# Patient Record
Sex: FEMALE | Race: WHITE
[De-identification: names, ages, dates, MRNs, and addresses within clinical notes are randomized per-mention and may not be internally consistent; named-entity substitution may affect disease eponyms.]

---

## 2020-07-06 ENCOUNTER — HOSPITAL ENCOUNTER (INPATIENT)
Dept: HOSPITAL 61 - ER | Age: 35
LOS: 4 days | Discharge: HOME | DRG: 389 | End: 2020-07-10
Attending: INTERNAL MEDICINE | Admitting: INTERNAL MEDICINE
Payer: COMMERCIAL

## 2020-07-06 VITALS — DIASTOLIC BLOOD PRESSURE: 72 MMHG | SYSTOLIC BLOOD PRESSURE: 118 MMHG

## 2020-07-06 VITALS — DIASTOLIC BLOOD PRESSURE: 67 MMHG | SYSTOLIC BLOOD PRESSURE: 120 MMHG

## 2020-07-06 VITALS — HEIGHT: 65 IN | WEIGHT: 172.4 LBS | BODY MASS INDEX: 28.72 KG/M2

## 2020-07-06 VITALS — SYSTOLIC BLOOD PRESSURE: 118 MMHG | DIASTOLIC BLOOD PRESSURE: 74 MMHG

## 2020-07-06 VITALS — DIASTOLIC BLOOD PRESSURE: 49 MMHG | SYSTOLIC BLOOD PRESSURE: 136 MMHG

## 2020-07-06 VITALS — DIASTOLIC BLOOD PRESSURE: 62 MMHG | SYSTOLIC BLOOD PRESSURE: 93 MMHG

## 2020-07-06 VITALS — SYSTOLIC BLOOD PRESSURE: 102 MMHG | DIASTOLIC BLOOD PRESSURE: 62 MMHG

## 2020-07-06 DIAGNOSIS — K56.600: Primary | ICD-10-CM

## 2020-07-06 DIAGNOSIS — K52.9: ICD-10-CM

## 2020-07-06 DIAGNOSIS — Z20.828: ICD-10-CM

## 2020-07-06 DIAGNOSIS — Z98.84: ICD-10-CM

## 2020-07-06 DIAGNOSIS — Z83.3: ICD-10-CM

## 2020-07-06 DIAGNOSIS — E05.90: ICD-10-CM

## 2020-07-06 DIAGNOSIS — E53.8: ICD-10-CM

## 2020-07-06 DIAGNOSIS — N39.0: ICD-10-CM

## 2020-07-06 DIAGNOSIS — Z88.8: ICD-10-CM

## 2020-07-06 DIAGNOSIS — E03.9: ICD-10-CM

## 2020-07-06 DIAGNOSIS — F42.9: ICD-10-CM

## 2020-07-06 LAB
ALBUMIN SERPL-MCNC: 3.8 G/DL (ref 3.4–5)
ALBUMIN/GLOB SERPL: 0.9 {RATIO} (ref 1–1.7)
ALP SERPL-CCNC: 37 U/L (ref 46–116)
ALT SERPL-CCNC: 21 U/L (ref 14–59)
ANION GAP SERPL CALC-SCNC: 11 MMOL/L (ref 6–14)
APTT PPP: YELLOW S
AST SERPL-CCNC: 19 U/L (ref 15–37)
BACTERIA #/AREA URNS HPF: (no result) /HPF
BASOPHILS # BLD AUTO: 0 X10^3/UL (ref 0–0.2)
BASOPHILS NFR BLD: 0 % (ref 0–3)
BILIRUB SERPL-MCNC: 0.4 MG/DL (ref 0.2–1)
BILIRUB UR QL STRIP: NEGATIVE
BUN SERPL-MCNC: 15 MG/DL (ref 7–20)
BUN/CREAT SERPL: 15 (ref 6–20)
CALCIUM SERPL-MCNC: 9 MG/DL (ref 8.5–10.1)
CHLORIDE SERPL-SCNC: 103 MMOL/L (ref 98–107)
CO2 SERPL-SCNC: 26 MMOL/L (ref 21–32)
CREAT SERPL-MCNC: 1 MG/DL (ref 0.6–1)
EOSINOPHIL NFR BLD: 0 % (ref 0–3)
EOSINOPHIL NFR BLD: 0 X10^3/UL (ref 0–0.7)
ERYTHROCYTE [DISTWIDTH] IN BLOOD BY AUTOMATED COUNT: 17.7 % (ref 11.5–14.5)
FIBRINOGEN PPP-MCNC: CLEAR MG/DL
GFR SERPLBLD BASED ON 1.73 SQ M-ARVRAT: 63.5 ML/MIN
GLOBULIN SER-MCNC: 4.4 G/DL (ref 2.2–3.8)
GLUCOSE SERPL-MCNC: 125 MG/DL (ref 70–99)
HCT VFR BLD CALC: 39.8 % (ref 36–47)
HGB BLD-MCNC: 12.9 G/DL (ref 12–15.5)
LIPASE: 167 U/L (ref 73–393)
LYMPHOCYTES # BLD: 1.3 X10^3/UL (ref 1–4.8)
LYMPHOCYTES NFR BLD AUTO: 13 % (ref 24–48)
MCH RBC QN AUTO: 26 PG (ref 25–35)
MCHC RBC AUTO-ENTMCNC: 32 G/DL (ref 31–37)
MCV RBC AUTO: 79 FL (ref 79–100)
MONO #: 0.6 X10^3/UL (ref 0–1.1)
MONOCYTES NFR BLD: 6 % (ref 0–9)
NEUT #: 8.1 X10^3/UL (ref 1.8–7.7)
NEUTROPHILS NFR BLD AUTO: 81 % (ref 31–73)
NITRITE UR QL STRIP: NEGATIVE
PH UR STRIP: 6.5 [PH]
PLATELET # BLD AUTO: 328 X10^3/UL (ref 140–400)
POTASSIUM SERPL-SCNC: 3.9 MMOL/L (ref 3.5–5.1)
PROT SERPL-MCNC: 8.2 G/DL (ref 6.4–8.2)
PROT UR STRIP-MCNC: NEGATIVE MG/DL
RBC # BLD AUTO: 5.02 X10^6/UL (ref 3.5–5.4)
RBC #/AREA URNS HPF: (no result) /HPF (ref 0–2)
SODIUM SERPL-SCNC: 140 MMOL/L (ref 136–145)
SQUAMOUS #/AREA URNS LPF: (no result) /LPF
UROBILINOGEN UR-MCNC: 1 MG/DL
WBC # BLD AUTO: 10 X10^3/UL (ref 4–11)
WBC #/AREA URNS HPF: (no result) /HPF (ref 0–4)

## 2020-07-06 PROCEDURE — 96372 THER/PROPH/DIAG INJ SC/IM: CPT

## 2020-07-06 PROCEDURE — 87186 SC STD MICRODIL/AGAR DIL: CPT

## 2020-07-06 PROCEDURE — G0378 HOSPITAL OBSERVATION PER HR: HCPCS

## 2020-07-06 PROCEDURE — 87077 CULTURE AEROBIC IDENTIFY: CPT

## 2020-07-06 PROCEDURE — 83550 IRON BINDING TEST: CPT

## 2020-07-06 PROCEDURE — 96376 TX/PRO/DX INJ SAME DRUG ADON: CPT

## 2020-07-06 PROCEDURE — 87086 URINE CULTURE/COLONY COUNT: CPT

## 2020-07-06 PROCEDURE — 81001 URINALYSIS AUTO W/SCOPE: CPT

## 2020-07-06 PROCEDURE — 74177 CT ABD & PELVIS W/CONTRAST: CPT

## 2020-07-06 PROCEDURE — C9113 INJ PANTOPRAZOLE SODIUM, VIA: HCPCS

## 2020-07-06 PROCEDURE — 76856 US EXAM PELVIC COMPLETE: CPT

## 2020-07-06 PROCEDURE — 83690 ASSAY OF LIPASE: CPT

## 2020-07-06 PROCEDURE — 83540 ASSAY OF IRON: CPT

## 2020-07-06 PROCEDURE — 81025 URINE PREGNANCY TEST: CPT

## 2020-07-06 PROCEDURE — 85027 COMPLETE CBC AUTOMATED: CPT

## 2020-07-06 PROCEDURE — 88175 CYTOPATH C/V AUTO FLUID REDO: CPT

## 2020-07-06 PROCEDURE — 74022 RADEX COMPL AQT ABD SERIES: CPT

## 2020-07-06 PROCEDURE — 82962 GLUCOSE BLOOD TEST: CPT

## 2020-07-06 PROCEDURE — 80048 BASIC METABOLIC PNL TOTAL CA: CPT

## 2020-07-06 PROCEDURE — 85025 COMPLETE CBC W/AUTO DIFF WBC: CPT

## 2020-07-06 PROCEDURE — 82607 VITAMIN B-12: CPT

## 2020-07-06 PROCEDURE — 74018 RADEX ABDOMEN 1 VIEW: CPT

## 2020-07-06 PROCEDURE — 96374 THER/PROPH/DIAG INJ IV PUSH: CPT

## 2020-07-06 PROCEDURE — 36415 COLL VENOUS BLD VENIPUNCTURE: CPT

## 2020-07-06 PROCEDURE — 80053 COMPREHEN METABOLIC PANEL: CPT

## 2020-07-06 PROCEDURE — 96361 HYDRATE IV INFUSION ADD-ON: CPT

## 2020-07-06 PROCEDURE — 83605 ASSAY OF LACTIC ACID: CPT

## 2020-07-06 RX ADMIN — HYDROMORPHONE HYDROCHLORIDE PRN MG: 2 INJECTION INTRAMUSCULAR; INTRAVENOUS; SUBCUTANEOUS at 06:06

## 2020-07-06 RX ADMIN — BACITRACIN SCH MLS/HR: 5000 INJECTION, POWDER, FOR SOLUTION INTRAMUSCULAR at 17:29

## 2020-07-06 RX ADMIN — HYDROMORPHONE HYDROCHLORIDE PRN MG: 2 INJECTION INTRAMUSCULAR; INTRAVENOUS; SUBCUTANEOUS at 10:45

## 2020-07-06 RX ADMIN — PANTOPRAZOLE SODIUM SCH MG: 40 INJECTION, POWDER, FOR SOLUTION INTRAVENOUS at 17:27

## 2020-07-06 RX ADMIN — HYDROMORPHONE HYDROCHLORIDE PRN MG: 2 INJECTION INTRAMUSCULAR; INTRAVENOUS; SUBCUTANEOUS at 13:35

## 2020-07-06 RX ADMIN — MORPHINE SULFATE PRN MG: 2 INJECTION, SOLUTION INTRAMUSCULAR; INTRAVENOUS at 17:28

## 2020-07-06 RX ADMIN — BACITRACIN SCH MLS/HR: 5000 INJECTION, POWDER, FOR SOLUTION INTRAMUSCULAR at 06:06

## 2020-07-06 NOTE — PHYS DOC
Past Medical History


Past Medical History:  Hypothyroid, Other


Additional Past Medical Histor:  OCD,VIT B DEFFIC.


Past Surgical History:  Other


Additional Past Surgical Histo:  GASTRIC SLEEVE,TUBAL LIGATION,C SECT X 5


Smoking Status:  Never Smoker


Alcohol Use:  None





General Adult


EDM:


Chief Complaint:  ABDOMINAL PAIN





HPI:


HPI:





Patient is a 34  year old female who presents to the ED with a chief complaint 

of abdominal pain.  Patient states that she has had the pain on and off all day 

today.  Patient states that she had episodes of vomiting also today.  Patient 

has been states that he has had diarrhea all day.  Patient states that the 

barbiturate yesterday and thinks that she may have had something that caused 

this.  Patient denies pregnancy.  Patient did say that she has a history of 

gastric bypass surgery and has a gastric pouch currently.





Review of Systems:


Review of Systems:


Constitutional:   Denies fever or chills. []


Eyes:   Denies change in visual acuity. []


HENT:   Denies nasal congestion or sore throat. [] 


Respiratory:   Denies cough or shortness of breath. [] 


Cardiovascular:   Denies chest pain or edema. [] 


GI:   Patient complains of abdominal pain, nausea and vomiting [] 


:  Denies dysuria. [] 


Neurologic:   Denies headache, focal weakness or sensory changes. []





Heart Score:


Risk Factors:


Risk Factors:  DM, Current or recent (<one month) smoker, HTN, HLP, family 

history of CAD, obesity.


Risk Scores:


Score 0 - 3:  2.5% MACE over next 6 weeks - Discharge Home


Score 4 - 6:  20.3% MACE over next 6 weeks - Admit for Clinical Observation


Score 7 - 10:  72.7% MACE over next 6 weeks - Early Invasive Strategies





Current Medications:





Current Medications








 Medications


  (Trade)  Dose


 Ordered  Sig/Colton  Start Time


 Stop Time Status Last Admin


Dose Admin


 


 Dicyclomine HCl


  (Bentyl)  20 mg  1X  ONCE  7/6/20 02:30


 7/6/20 02:31 DC 7/6/20 02:21


20 MG


 


 Hydromorphone HCl


  (Dilaudid)  1 mg  1X  ONCE  7/6/20 03:00


 7/6/20 03:01 DC  





 


 Info


  (CONTRAST GIVEN


 -- Rx MONITORING)  1 each  PRN DAILY  PRN  7/6/20 03:15


 7/8/20 03:14   





 


 Iohexol


  (Omnipaque 300


 Mg/ml)  75 ml  1X  ONCE  7/6/20 03:30


 7/6/20 03:31  7/6/20 03:11


75 ML


 


 Morphine Sulfate


  (Morphine


 Sulfate)  4 mg  1X  ONCE  7/6/20 02:30


 7/6/20 02:31 DC  





 


 Ondansetron HCl


  (Zofran)  4 mg  1X  ONCE  7/6/20 02:30


 7/6/20 02:31 DC 7/6/20 02:37


4 MG


 


 Sodium Chloride  1,000 ml @ 


 1,000 mls/hr  1X  ONCE  7/6/20 01:30


 7/6/20 02:29 DC 7/6/20 01:10


1,000 MLS/HR











Allergies:


Allergies:





Allergies








Coded Allergies Type Severity Reaction Last Updated Verified


 


  Penicillins Allergy Intermediate HIVES 7/6/20 Yes


 


  morphine Allergy Intermediate hives 7/6/20 Yes











Physical Exam:


PE:





Constitutional: Well developed, well nourished, no acute distress, non-toxic 

appearance. []


HENT: Normocephalic, atraumatic


Eyes: EOMI


Neck: Normal range of motion, Supple


Cardiovascular:Heart rate regular rhythm


Lungs & Thorax:  Bilateral breath sounds clear to auscultation []


Abdomen: Diffuse abdominal tenderness.  No focal abdominal tenderness


Extremities: No tenderness, ROM intact


Neurologic: Alert and oriented X 3





Current Patient Data:


Labs:





                                Laboratory Tests








Test


 7/6/20


00:48 7/6/20


01:30 7/6/20


01:54


 


White Blood Count


 10.0 x10^3/uL


(4.0-11.0) 


 





 


Red Blood Count


 5.02 x10^6/uL


(3.50-5.40) 


 





 


Hemoglobin


 12.9 g/dL


(12.0-15.5) 


 





 


Hematocrit


 39.8 %


(36.0-47.0) 


 





 


Mean Corpuscular Volume


 79 fL ()


 


 





 


Mean Corpuscular Hemoglobin 26 pg (25-35)    


 


Mean Corpuscular Hemoglobin


Concent 32 g/dL


(31-37) 


 





 


Red Cell Distribution Width


 17.7 %


(11.5-14.5)  H 


 





 


Platelet Count


 328 x10^3/uL


(140-400) 


 





 


Neutrophils (%) (Auto) 81 % (31-73)  H  


 


Lymphocytes (%) (Auto) 13 % (24-48)  L  


 


Monocytes (%) (Auto) 6 % (0-9)    


 


Eosinophils (%) (Auto) 0 % (0-3)    


 


Basophils (%) (Auto) 0 % (0-3)    


 


Neutrophils # (Auto)


 8.1 x10^3/uL


(1.8-7.7)  H 


 





 


Lymphocytes # (Auto)


 1.3 x10^3/uL


(1.0-4.8) 


 





 


Monocytes # (Auto)


 0.6 x10^3/uL


(0.0-1.1) 


 





 


Eosinophils # (Auto)


 0.0 x10^3/uL


(0.0-0.7) 


 





 


Basophils # (Auto)


 0.0 x10^3/uL


(0.0-0.2) 


 





 


Sodium Level


 140 mmol/L


(136-145) 


 





 


Potassium Level


 3.9 mmol/L


(3.5-5.1) 


 





 


Chloride Level


 103 mmol/L


() 


 





 


Carbon Dioxide Level


 26 mmol/L


(21-32) 


 





 


Anion Gap 11 (6-14)    


 


Blood Urea Nitrogen


 15 mg/dL


(7-20) 


 





 


Creatinine


 1.0 mg/dL


(0.6-1.0) 


 





 


Estimated GFR


(Cockcroft-Gault) 63.5  


 


 





 


BUN/Creatinine Ratio 15 (6-20)    


 


Glucose Level


 125 mg/dL


(70-99)  H 


 





 


Lactic Acid Level


 1.7 mmol/L


(0.4-2.0) 


 





 


Calcium Level


 9.0 mg/dL


(8.5-10.1) 


 





 


Total Bilirubin


 0.4 mg/dL


(0.2-1.0) 


 





 


Aspartate Amino Transferase


(AST) 19 U/L (15-37)


 


 





 


Alanine Aminotransferase (ALT)


 21 U/L (14-59)


 


 





 


Alkaline Phosphatase


 37 U/L


()  L 


 





 


Total Protein


 8.2 g/dL


(6.4-8.2) 


 





 


Albumin


 3.8 g/dL


(3.4-5.0) 


 





 


Albumin/Globulin Ratio


 0.9 (1.0-1.7)


L 


 





 


Lipase


 167 U/L


() 


 





 


Urine Collection Type  Unknown   


 


Urine Color  Yellow   


 


Urine Clarity  Clear   


 


Urine pH


 


 6.5 (<5.0-8.0)


 





 


Urine Specific Gravity


 


 >=1.030


(1.000-1.030) 





 


Urine Protein


 


 Negative mg/dL


(NEG-TRACE) 





 


Urine Glucose (UA)


 


 Negative mg/dL


(NEG) 





 


Urine Ketones (Stick)


 


 40 mg/dL (NEG)


 





 


Urine Blood


 


 Moderate (NEG)


 





 


Urine Nitrite


 


 Negative (NEG)


 





 


Urine Bilirubin


 


 Negative (NEG)


 





 


Urine Urobilinogen Dipstick


 


 1.0 mg/dL (0.2


mg/dL) 





 


Urine Leukocyte Esterase


 


 Negative (NEG)


 





 


Urine RBC


 


 Occ /HPF (0-2)


 





 


Urine WBC


 


 5-10 /HPF


(0-4) 





 


Urine Squamous Epithelial


Cells 


 Mod /LPF  


 





 


Urine Bacteria


 


 Many /HPF


(0-FEW) 





 


Urine Mucus  Marked /LPF   


 


POC Urine HCG, Qualitative


 


 


 Hcg negative


(Negative)





                                Laboratory Tests


7/6/20 00:48








                                Laboratory Tests


7/6/20 00:48








Vital Signs:





                                   Vital Signs








  Date Time  Temp Pulse Resp B/P (MAP) Pulse Ox O2 Delivery O2 Flow Rate FiO2


 


7/6/20 02:11  58 20 116/69 (85) 99 Room Air  


 


7/6/20 00:38 98.3       





 98.3       











EKG:


EKG:


[]





Radiology/Procedures:


Radiology/Procedures:


[]


Impression:


CT ABD/PELVIS


IMPRESSION:


 


1. Multiple fluid-filled mildly dilated loops of small bowel without a 


definite transition point to decompressed small bowel. Findings may 


represent enteritis or potentially early/partial small bowel obstruction. 


Fluid is noted in the cecum.


 


2. Poorly characterized area of masslike fullness in the left hemipelvis 


with adjacent pelvic lymphadenopathy. Imaging characteristics are 


nonspecific, but considerations would include conglomerate 


lymphadenopathy, rectosigmoid mass, or exophytic uterine/cervical mass. 


Contrast-enhanced pelvic MRI may provide additional information.





Course & Med Decision Making:


Course & Med Decision Making


Pertinent Labs and Imaging studies reviewed. (See chart for details)





Labs are within normal limits.





Patient is given Dilaudid 1 mg IV and Zofran 4 mg IV.  She is also given Bentyl 

20 mg IM.





Awaiting CT scan results.





CT shows enteritis and partial small bowel obstruction.  There is also a pelvic 

mass seen.





Patient will be admitted for further evaluation and treatment.





No NG tube placed as patient has a gastric pouch.





Discussed results and plan of care with patient and family.





Patient will be admitted to the hospitalist service.





Dragon Disclaimer:


Dragon Disclaimer:


This electronic medical record was generated, in whole or in part, using a voice

 recognition dictation system.





Departure


Departure


Impression:  


   Primary Impression:  


   Enteritis


   Additional Impressions:  


   Small bowel obstruction, partial


   Pelvic mass in female


Disposition:  09 ADMITTED AS INPATIENT


Admitting Physician:  HIMS


Condition:  GOOD


Referrals:  


UNKNOWN PCP NAME (PCP)





Justicifation of Admission Dx:


Justifications for Admission:


Justification of Admission Dx:  Yes


Comments:


ENTERITIS, PARTIAL SBO, PELVIC MASS











DESEAN OLIVAS DO            Jul 6, 2020 03:26

## 2020-07-06 NOTE — RAD
CT ABD PELV W/ IV CONTRST ONLY 

 

History: pain  

 

Comparison: None.

 

Technique: After administration of intravenous contrast, helical CT of the

abdomen and pelvis was performed from the lung bases through the ischial 

tuberosities. Coronal and sagittal reconstructions were obtained. 75 mL of

Omnipaque 300 were used. One or more of the following dose reduction 

techniques were utilized: Automated exposure control (AEC), Adjustment of 

mA and/or kV according to patient size, Use of iterative reconstruction 

technique such as ASiR, CT scan done according to ALARA and image 

gently/image wisely

 

Abdomen Findings:

The visualized lung bases are clear. Small hiatal hernia.

 

The liver, gallbladder, pancreas, spleen, and bilateral adrenal glands are

normal. 

 

Symmetric renal enhancement. There is no focal renal mass. There is no 

hydronephrosis.   

 

Gastric sleeve. Multiple fluid-filled mildly dilated loops of small bowel 

measuring up to 3.4 cm in diameter. No definite transition point to 

decompressed bowel. Fluid in the cecum.

 

There is no free fluid.

 

There is no mesenteric or retroperitoneal adenopathy. 

 

The abdominal aorta is normal in caliber. 

 

Pelvis Findings:

Urinary bladder is partially distended. Uterus is present. Area of 

masslike fullness in the left hemipelvis involving or abutting the 

rectosigmoid region and the lower uterus measuring 5.0 x 4.0 x 4.4 cm. 

Multiple adjacent abnormally enlarged lymph nodes, for example enlarged 

pelvic lymph node measuring 1.7 x 1.6 cm (series 2 image 69). This 

structure is separate from the left ovary. 

 

There is no acute bony abnormality.  

 

IMPRESSION:

 

1. Multiple fluid-filled mildly dilated loops of small bowel without a 

definite transition point to decompressed small bowel. Findings may 

represent enteritis or potentially early/partial small bowel obstruction. 

Fluid is noted in the cecum.

 

2. Poorly characterized area of masslike fullness in the left hemipelvis 

with adjacent pelvic lymphadenopathy. Imaging characteristics are 

nonspecific, but considerations would include conglomerate 

lymphadenopathy, rectosigmoid mass, or exophytic uterine/cervical mass. 

Contrast-enhanced pelvic MRI may provide additional information.

 

Electronically signed by: Kale Norris MD (7/6/2020 3:47 AM) [facility]

## 2020-07-06 NOTE — HP
ADMIT DATE:  07/06/2020



CHIEF COMPLAINT:  Abdominal pain, nausea, vomiting.



HISTORY OF PRESENT ILLNESS:  The patient is a pleasant 34-year-old female who

had a gastric sleeve surgery at [facility] 4 years ago by

Dr. Narvaez.  She thought Dr. Gallego also did participate in the surgery, but

now she realizes that was just Dr. Narvaez.  Basically, she presents with

abdominal pain and nausea, vomiting.  I discussed the case with ER physician. 

We admitted overnight for observation.  This morning, she is being examined.  I

have consulted GI.



PAST MEDICAL HISTORY:  The above-mentioned gastric sleeve, hyperthyroidism, OCD.



ALLERGIES:  PENICILLIN AND MORPHINE.



FAMILY HISTORY:  Diabetes.



SOCIAL HISTORY:  She does not drink, smoke or take drugs.  She is a supervisor

at a warehouse.



MEDICATIONS:  Reviewed, please refer to the MRAD.



REVIEW OF SYSTEMS:

GENERAL:  No history of weight change, weakness or fevers.

SKIN:  No bruising, hair changes or rashes.

EYES:  No blurred, double or loss of vision.

NOSE AND THROAT:  No history of nosebleeds, hoarseness or sore throat.

HEART:  No history of palpitations, chest pain or shortness of breath on

exertion.

LUNGS:  Denies cough, hemoptysis, wheezing or shortness of breath.

GASTROINTESTINAL:  The patient complains of abdominal pain.

GENITOURINARY:  No history of frequency, urgency, hesitancy or nocturia.

NEUROLOGIC:  Denies history of numbness, tingling, tremor or weakness.

PSYCHIATRIC:  No history of panic, anxiety or depression.

ENDOCRINE:  No history of heat or cold intolerance, polyuria or polydipsia.

EXTREMITIES:  Denies muscle weakness, joint pain, pain on walking or stiffness.



PHYSICAL EXAMINATION:

VITALS:  Within normal limits and are stable.

GENERAL:  No apparent distress.  Alert and oriented.

HEENT:  Normal cephalic atraumatic, external auditory canals are patent

EYES:  Extraocular muscles are intact, pupils are equally round and reactive to

light and accommodation

MUSCULOSKELETAL:  Well developed, well nourished, good range of motion

ENDOCRINE:  No thyromegaly was palpated

LYMPHATICS:  No cervical chain or axillary nodes were noted

HEMATOPOIETIC:  No bruising

NECK:  Supple, no JVD, no thyromegaly was noted.

LUNGS:  Clear to auscultation in all lung fields without rhonchi or wheezing.

HEART:  RRR, S1, S2 present.  Peripheral pulses intact, no obvious murmurs were

noted.

ABDOMEN:  Her abdomen is tender.  Decreased bowel sounds.

EXTREMITIES:  Without any cyanosis, clubbing, or edema.  Pedal pulses intact,

Homans sign is negative.

NEUROLOGIC:  Normal speech, normal tone.  A & O x3, moves all extremities, no

obvious focal deficits.

PSYCHIATRIC:  Normal affect, normal mood.  Stable.

SKIN:  No ulcerations or rashes, good skin turgor, no jaundice.

VASCULAR:  Good capillary refill, neurovascular bundle appears to be intact.



IMAGING AND LABORATORY DATA:  Not available because the computer is down.



ASSESSMENT AND PLAN:  Partial small-bowel obstruction and possible enteritis. 

The patient will be admitted.  We will consult GI.  IV fluids, home meds, DVT

prophylaxis.  Full code.  Await further GI input.  Hope to discharge soon if her

symptoms resolve.

 



______________________________

WILLA RICO DO



DR:  MEHUL/terrence  JOB#:  [id number] / [id number]

DD:  07/06/2020 12:17  DT:  07/06/2020 12:29

## 2020-07-06 NOTE — PDOC2
GI CONSULT


Reason For Consult:


enteritis and partial SBO





HPI:


HPI:


35 y/o female w/ acute onset of diffuse stabbing abdominal pain yesterday at 

4:00 p.m.  Associated w/ nausea and then "tons" of vomiting when arrived at ER. 

Ongoing intermittent pain since admission - mostly from belly button up.  Nausea

w/o recurrent vomiting.  Last stooled on Friday 7/3/20.  No flatus.





No similar symptoms in the past.  Denies precipitating events - chart mentions 

"ate bad food" and indicates diarrhea - she denies both.  Says had hamburgers at

home with family to celebrate  and no one else got sick.  


Typically no chronic GI issues except constipation controlled w/ laxatives QHS -

with these, stools once daily.  No reflux/heartburn, dysphagia, chronic n/v or 

abd pain, diarrhea, hematochezia, or melena.


S/p gastric sleeve ~4 years ago w/ Dr. Narvaez @ [facility].  Hasn't seen 

him since 3 months after surgery but sees a nurse practitioner (?in the same 

clinic) for general care.  Lost >100 pounds, then gained some back, and now has 

lost more w/ help of Contrave (started 1 month ago).


Thinks had EGD w/ bariatrician prior to gastric sleeve, recalled as normal.  No 

previous colonoscopy.  No GB, liver, pancreas, or PUD history.  No NSAIDs.





PMH:


PMH:


hypothyroidism, B12 deficiency, OCD


tubal ligation,  x 5, gastric sleeve





FH:


Family History:  Other ("I don't know about my mom's side of the family")





Social History:


Smoke:  No


ALCOHOL:  none


Drugs:  None





ROS:





GEN:  +chills +sweats


HEENT: Denies blurred vision, sore throat


CV: Denies chest pain


RESP: Denies shortness of air, cough


GI: Per HPI


: Denies hematuria, dysuria


ENDO: +intentional weight loss


NEURO: Denies confusion, dizziness


MSK: Denies weakness, joint pain/swelling


SKIN: Denies jaundice, pruritus





Vitals:


Vitals:





                                   Vital Signs








  Date Time  Temp Pulse Resp B/P (MAP) Pulse Ox O2 Delivery O2 Flow Rate FiO2


 


20 14:26   12   Room Air  


 


20 11:00 98.5 69  120/67 (84) 98   





 98.5       











Labs:


Labs:





Laboratory Tests








Test


 20


00:48 20


01:30 20


01:54


 


White Blood Count


 10.0 x10^3/uL


(4.0-11.0) 


 





 


Red Blood Count


 5.02 x10^6/uL


(3.50-5.40) 


 





 


Hemoglobin


 12.9 g/dL


(12.0-15.5) 


 





 


Hematocrit


 39.8 %


(36.0-47.0) 


 





 


Mean Corpuscular Volume 79 fL ()   


 


Mean Corpuscular Hemoglobin 26 pg (25-35)   


 


Mean Corpuscular Hemoglobin


Concent 32 g/dL


(31-37) 


 





 


Red Cell Distribution Width


 17.7 %


(11.5-14.5) 


 





 


Platelet Count


 328 x10^3/uL


(140-400) 


 





 


Neutrophils (%) (Auto) 81 % (31-73)   


 


Lymphocytes (%) (Auto) 13 % (24-48)   


 


Monocytes (%) (Auto) 6 % (0-9)   


 


Eosinophils (%) (Auto) 0 % (0-3)   


 


Basophils (%) (Auto) 0 % (0-3)   


 


Neutrophils # (Auto)


 8.1 x10^3/uL


(1.8-7.7) 


 





 


Lymphocytes # (Auto)


 1.3 x10^3/uL


(1.0-4.8) 


 





 


Monocytes # (Auto)


 0.6 x10^3/uL


(0.0-1.1) 


 





 


Eosinophils # (Auto)


 0.0 x10^3/uL


(0.0-0.7) 


 





 


Basophils # (Auto)


 0.0 x10^3/uL


(0.0-0.2) 


 





 


Sodium Level


 140 mmol/L


(136-145) 


 





 


Potassium Level


 3.9 mmol/L


(3.5-5.1) 


 





 


Chloride Level


 103 mmol/L


() 


 





 


Carbon Dioxide Level


 26 mmol/L


(21-32) 


 





 


Anion Gap 11 (6-14)   


 


Blood Urea Nitrogen


 15 mg/dL


(7-20) 


 





 


Creatinine


 1.0 mg/dL


(0.6-1.0) 


 





 


Estimated GFR


(Cockcroft-Gault) 63.5 


 


 





 


BUN/Creatinine Ratio 15 (6-20)   


 


Glucose Level


 125 mg/dL


(70-99) 


 





 


Lactic Acid Level


 1.7 mmol/L


(0.4-2.0) 


 





 


Calcium Level


 9.0 mg/dL


(8.5-10.1) 


 





 


Total Bilirubin


 0.4 mg/dL


(0.2-1.0) 


 





 


Aspartate Amino Transf


(AST/SGOT) 19 U/L (15-37) 


 


 





 


Alanine Aminotransferase


(ALT/SGPT) 21 U/L (14-59) 


 


 





 


Alkaline Phosphatase


 37 U/L


() 


 





 


Total Protein


 8.2 g/dL


(6.4-8.2) 


 





 


Albumin


 3.8 g/dL


(3.4-5.0) 


 





 


Albumin/Globulin Ratio 0.9 (1.0-1.7)   


 


Lipase


 167 U/L


() 


 





 


Urine Collection Type  Unknown  


 


Urine Color  Yellow  


 


Urine Clarity  Clear  


 


Urine pH  6.5 (<5.0-8.0)  


 


Urine Specific Gravity


 


 >=1.030


(1.000-1.030) 





 


Urine Protein


 


 Negative mg/dL


(NEG-TRACE) 





 


Urine Glucose (UA)


 


 Negative mg/dL


(NEG) 





 


Urine Ketones (Stick)  40 mg/dL (NEG)  


 


Urine Blood  Moderate (NEG)  


 


Urine Nitrite  Negative (NEG)  


 


Urine Bilirubin  Negative (NEG)  


 


Urine Urobilinogen Dipstick


 


 1.0 mg/dL (0.2


mg/dL) 





 


Urine Leukocyte Esterase  Negative (NEG)  


 


Urine RBC  Occ /HPF (0-2)  


 


Urine WBC


 


 5-10 /HPF


(0-4) 





 


Urine Squamous Epithelial


Cells 


 Mod /LPF 


 





 


Urine Bacteria


 


 Many /HPF


(0-FEW) 





 


Urine Mucus  Marked /LPF  


 


Bedside Urine HCG, Qualitative


 


 


 Hcg negative


(Negative)











Allergies:


Coded Allergies:  


     Penicillins (Verified  Allergy, Intermediate, HIVES, 20)


     morphine (Verified  Allergy, Intermediate, hives, 20)





Medications:





Current Medications








 Medications


  (Trade)  Dose


 Ordered  Sig/Colton


 Route


 PRN Reason  Start Time


 Stop Time Status Last Admin


Dose Admin


 


 Sodium Chloride  1,000 ml @ 


 1,000 mls/hr  1X  ONCE


 IV


   20 01:30


 20 02:29 DC 20 01:10





 


 Ondansetron HCl


  (Zofran)  4 mg  1X  ONCE


 IVP


   20 01:30


 20 01:31 DC 20 01:10





 


 Ondansetron HCl


  (Zofran)  4 mg  1X  ONCE


 IVP


   20 02:30


 20 02:31 DC 20 02:37





 


 Dicyclomine HCl


  (Bentyl)  20 mg  1X  ONCE


 IM


   20 02:30


 20 02:31 DC 20 02:21





 


 Iohexol


  (Omnipaque 300


 Mg/ml)  75 ml  1X  ONCE


 IV


   20 03:30


 20 03:31 DC 20 03:11





 


 Sodium Chloride  1,000 ml @ 


 125 mls/hr  Q8H


 IV


   20 06:00


    20 06:06





 


 Hydromorphone HCl


  (Dilaudid)  1 mg  PRN Q4HRS  PRN


 IV


 SEVERE PAIN 7-10  20 06:00


    20 13:35














Imaging:


Imaging:


CT A/P


IMPRESSION:


1. Multiple fluid-filled mildly dilated loops of small bowel without a definite 

transition point to decompressed small bowel. Findings may represent enteritis 

or potentially early/partial small bowel obstruction. Fluid is noted in the 

cecum.


2. Poorly characterized area of masslike fullness in the left hemipelvis with 

adjacent pelvic lymphadenopathy. Imaging characteristics are nonspecific, but 

considerations would include conglomerate lymphadenopathy, rectosigmoid mass, or

exophytic uterine/cervical mass. Contrast-enhanced pelvic MRI may provide 

additional information.





PE:





GEN: uncomfortable


HEENT: Atraumatic, PERRL


LUNGS: CTAB


HEART: RRR


ABD: NABS, soft, periumbilical tenderness and above


EXTREMITY: No edema


SKIN: No rashes, no jaundice


NEURO/PSYCH: A & O 3





A/P:


A/P:


Cramping upper abdominal pain, n/v


?UTI


Abnormal CT w/ enteritis vs pSBO and masslike fullness in left hemipelvis w/ 

lymphadenopathy


S/p gastric sleeve


CRC screen - average risk


H/o constipation - controlled w/ laxatives QHS


S/p  x 5 and tubal ligation





--


Seen earlier this afternoon when [employer] unavailable.


No bariatric services available here - says normally goes to [facility] ER - 

should follow-up w/ Dr. Narvaez.


Reviewed w/ Dr. Tucker - will ask for surgery opinion and check KUB in a.m.  


Continue NPO.  Add IV acid-reducer.  Monitor labs in a.m.


IVF and home meds per Dr. Suarez.











JOEY TOPETE          2020 14:55

## 2020-07-06 NOTE — NUR
Patient wants home meds restarted. Patient is NPO. Consults orders received. Consults have 
been notified. KUB order received. Patient's pain has been managed with Morphine. Patient 
tolerated well.

## 2020-07-06 NOTE — PDOC
A/P:


Abd pain, n/v


Abnormal CT w/ enteritis vs pSBO, masslike fullness in left hemipelvis w/ 

lymphadenopathy


S/p gastric sleeve





--


[employer] down - will enter full consult note when able.


Reviewed w/ Dr. Tucker - will ask for surgery opinion and recheck KUB in a.m.  

No bariatric services available here.


Continue NPO and IVF per Dr. Suarez.  Add IV acid-reducer.





Justicifation of Admission Dx:


Justifications for Admission:


Justification of Admission Dx:  Yes











JOEY TOPETE          Jul 6, 2020 14:33

## 2020-07-06 NOTE — NUR
Dr. Suarez placed order for Morphine 2mg IVP Q2 and Benadryl 25mg IVP Q8. DR. Suarez is 
aware of the allergy to Morphine. Patient requesting the Morphine for pain.

## 2020-07-06 NOTE — NUR
SW following. Reviewed chart and spoke with RN. Pt recently had gastric sleeve surgery a few 
days ago. Pt plans to return home at discharge. Pt on room air. Pt on IV pain medications. 
No further SW needs at this time per RN.

## 2020-07-07 VITALS — DIASTOLIC BLOOD PRESSURE: 46 MMHG | SYSTOLIC BLOOD PRESSURE: 130 MMHG

## 2020-07-07 VITALS — SYSTOLIC BLOOD PRESSURE: 119 MMHG | DIASTOLIC BLOOD PRESSURE: 72 MMHG

## 2020-07-07 VITALS — SYSTOLIC BLOOD PRESSURE: 116 MMHG | DIASTOLIC BLOOD PRESSURE: 73 MMHG

## 2020-07-07 VITALS — SYSTOLIC BLOOD PRESSURE: 108 MMHG | DIASTOLIC BLOOD PRESSURE: 62 MMHG

## 2020-07-07 VITALS — DIASTOLIC BLOOD PRESSURE: 71 MMHG | SYSTOLIC BLOOD PRESSURE: 119 MMHG

## 2020-07-07 VITALS — DIASTOLIC BLOOD PRESSURE: 67 MMHG | SYSTOLIC BLOOD PRESSURE: 115 MMHG

## 2020-07-07 LAB
ANION GAP SERPL CALC-SCNC: 6 MMOL/L (ref 6–14)
BUN SERPL-MCNC: 10 MG/DL (ref 7–20)
CALCIUM SERPL-MCNC: 7.6 MG/DL (ref 8.5–10.1)
CHLORIDE SERPL-SCNC: 108 MMOL/L (ref 98–107)
CO2 SERPL-SCNC: 28 MMOL/L (ref 21–32)
CREAT SERPL-MCNC: 1.1 MG/DL (ref 0.6–1)
ERYTHROCYTE [DISTWIDTH] IN BLOOD BY AUTOMATED COUNT: 17.4 % (ref 11.5–14.5)
GFR SERPLBLD BASED ON 1.73 SQ M-ARVRAT: 56.9 ML/MIN
GLUCOSE SERPL-MCNC: 82 MG/DL (ref 70–99)
HCT VFR BLD CALC: 31.9 % (ref 36–47)
HGB BLD-MCNC: 10.2 G/DL (ref 12–15.5)
MCH RBC QN AUTO: 26 PG (ref 25–35)
MCHC RBC AUTO-ENTMCNC: 32 G/DL (ref 31–37)
MCV RBC AUTO: 80 FL (ref 79–100)
PLATELET # BLD AUTO: 199 X10^3/UL (ref 140–400)
POTASSIUM SERPL-SCNC: 4.3 MMOL/L (ref 3.5–5.1)
RBC # BLD AUTO: 3.98 X10^6/UL (ref 3.5–5.4)
SODIUM SERPL-SCNC: 142 MMOL/L (ref 136–145)
WBC # BLD AUTO: 3.8 X10^3/UL (ref 4–11)

## 2020-07-07 RX ADMIN — BACITRACIN SCH MLS/HR: 5000 INJECTION, POWDER, FOR SOLUTION INTRAMUSCULAR at 08:50

## 2020-07-07 RX ADMIN — PANTOPRAZOLE SODIUM SCH MG: 40 INJECTION, POWDER, FOR SOLUTION INTRAVENOUS at 07:17

## 2020-07-07 RX ADMIN — MORPHINE SULFATE PRN MG: 2 INJECTION, SOLUTION INTRAMUSCULAR; INTRAVENOUS at 21:07

## 2020-07-07 RX ADMIN — MORPHINE SULFATE PRN MG: 2 INJECTION, SOLUTION INTRAMUSCULAR; INTRAVENOUS at 06:35

## 2020-07-07 RX ADMIN — BACITRACIN SCH MLS/HR: 5000 INJECTION, POWDER, FOR SOLUTION INTRAMUSCULAR at 17:56

## 2020-07-07 RX ADMIN — BACITRACIN SCH MLS/HR: 5000 INJECTION, POWDER, FOR SOLUTION INTRAMUSCULAR at 00:42

## 2020-07-07 NOTE — RAD
KUB

 

History: Reason: abd pain, partial SBO on CT / Spl. Instructions:  / 

History: 

 

Technique: Supine view the abdomen.

 

Comparison: CT July 6, 2020

 

Findings:

Moderate prominent small bowel gas within the left mid abdomen. 

Fluid-filled bowel is better characterize and previously seen CT. Air 

stool scattered throughout the imaged colon. Postoperative changes left 

upper abdomen.

 

Impression: 

1.  Mildly prominent air-filled loops of small bowel within the left mid 

abdomen. Previously seen fluid-filled small bowel is better characterized 

on prior CT.

 

Electronically signed by: Hardeep Kim DO (7/7/2020 8:32 AM) [id number]

## 2020-07-07 NOTE — NUR
SW consulted per high risk discharge. Reviewed chart and spoke with RN. Spoke with pt and 
family. Pt lives at home with significant other. Pt on room air. Pt on IV pain medication. 
Pt stated she can afford her medications. SW will continue following.

## 2020-07-07 NOTE — PDOC
Subjective:


Subjective:


"They're making sure I have no pain."


No n/v, no flatus or stool.





Objective:


Vital Signs:





                                   Vital Signs








  Date Time  Temp Pulse Resp B/P (MAP) Pulse Ox O2 Delivery O2 Flow Rate FiO2


 


7/7/20 07:51 97.4 73 16 130/46 (74) 97 Room Air  





 97.4       








Labs:





Laboratory Tests








Test


 7/7/20


05:30 7/7/20


06:30


 


White Blood Count 3.8 x10^3/uL  


 


Red Blood Count 3.98 x10^6/uL  


 


Hemoglobin 10.2 g/dL  


 


Hematocrit 31.9 %  


 


Mean Corpuscular Volume 80 fL  


 


Mean Corpuscular Hemoglobin 26 pg  


 


Mean Corpuscular Hemoglobin


Concent 32 g/dL 


 





 


Red Cell Distribution Width 17.4 %  


 


Platelet Count 199 x10^3/uL  


 


Sodium Level  142 mmol/L 


 


Potassium Level  4.3 mmol/L 


 


Chloride Level  108 mmol/L 


 


Carbon Dioxide Level  28 mmol/L 


 


Anion Gap  6 


 


Blood Urea Nitrogen  10 mg/dL 


 


Creatinine  1.1 mg/dL 


 


Estimated GFR


(Cockcroft-Gault) 


 56.9 





 


Glucose Level  82 mg/dL 


 


Calcium Level  7.6 mg/dL 


 


Iron Level  131 ug/dL 


 


Total Iron Binding Capacity  274 ug/dL 


 


Iron Saturation  48 % 


 


Vitamin B12 Level  1805 pg/mL 








Imaging:


KUB 7/7


Impression: 


1.  Mildly prominent air-filled loops of small bowel within the left mid 

abdomen. Previously seen fluid-filled small bowel is better characterized on 

prior CT.





PE:





GEN: NAD - was asleep


LUNGS: CTAB


HEART: RRR


ABD: NABS, S/ND, epigastric tenderness - less to BUQ, no tenderness in lower abd


NEURO/PSYCH: A & O 3





A/P:


Upper abdominal pain, n/v


?UTI - cx pending


Abnormal CT w/ enteritis vs pSBO, fullness in left hemipelvis, lymphadenopathy


S/p gastric sleeve





--


Interval imaging as above... has bowel sounds - ?try clears - await surgery 

recs.


Continue PPI - change to PO as able.


Needs bariatric surgeon follow-up - not available here.


Encouraged her to use pain medication PRN... getting morphine, Dilaudid, 

Benadryl.





Justicifation of Admission Dx:


Justifications for Admission:


Justification of Admission Dx:  Yes











JOEY TOPETE          Jul 7, 2020 09:50

## 2020-07-07 NOTE — PDOC
[facility] PROGRESS NOTE


Chief Complaint


Chief Complaint


Abdominal pain


gastric sleeve, hyperthyroidism, OCD.





History of Present Illness


History of Present Illness


7/7/2020


Patient seen and examined


She feels little better today


Chart reviewed


Discussed with RN


Working to try clear liquids





Vitals/I&O


Vitals/I&O:





                                   Vital Signs








  Date Time  Temp Pulse Resp B/P (MAP) Pulse Ox O2 Delivery O2 Flow Rate FiO2


 


7/7/20 07:51 97.4 73 16 130/46 (74) 97 Room Air  





 97.4       














                                    I & O   


 


 7/6/20 7/6/20 7/7/20





 15:00 23:00 07:00


 


Intake Total  0 ml 0 ml


 


Output Total  1 ml 


 


Balance  -1 ml 0 ml











Physical Exam


General:  Alert, Oriented X3, Cooperative, No acute distress


Heart:  Regular rate, Normal S1, Normal S2


Abdomen:  Soft, No tenderness, Other (ND)


Extremities:  No clubbing, No cyanosis


Skin:  No rashes, No breakdown





Labs


Labs:





Laboratory Tests








Test


 7/7/20


05:30 7/7/20


06:30


 


White Blood Count


 3.8 x10^3/uL


(4.0-11.0) 





 


Red Blood Count


 3.98 x10^6/uL


(3.50-5.40) 





 


Hemoglobin


 10.2 g/dL


(12.0-15.5) 





 


Hematocrit


 31.9 %


(36.0-47.0) 





 


Mean Corpuscular Volume 80 fL ()  


 


Mean Corpuscular Hemoglobin 26 pg (25-35)  


 


Mean Corpuscular Hemoglobin


Concent 32 g/dL


(31-37) 





 


Red Cell Distribution Width


 17.4 %


(11.5-14.5) 





 


Platelet Count


 199 x10^3/uL


(140-400) 





 


Sodium Level


 


 142 mmol/L


(136-145)


 


Potassium Level


 


 4.3 mmol/L


(3.5-5.1)


 


Chloride Level


 


 108 mmol/L


()


 


Carbon Dioxide Level


 


 28 mmol/L


(21-32)


 


Anion Gap  6 (6-14) 


 


Blood Urea Nitrogen


 


 10 mg/dL


(7-20)


 


Creatinine


 


 1.1 mg/dL


(0.6-1.0)


 


Estimated GFR


(Cockcroft-Gault) 


 56.9 





 


Glucose Level


 


 82 mg/dL


(70-99)


 


Calcium Level


 


 7.6 mg/dL


(8.5-10.1)


 


Iron Level


 


 131 ug/dL


()


 


Total Iron Binding Capacity


 


 274 ug/dL


(250-450)


 


Iron Saturation  48 % (15-34) 


 


Vitamin B12 Level


 


 1805 pg/mL


(247-911)











Assessment and Plan


Assessmemt and Plan


Problems


Medical Problems:


(1) Enteritis


Status: Acute  





(2) Pelvic mass in female


Status: Acute  





(3) Small bowel obstruction, partial


Status: Acute  





Abdominal pain


gastric sleeve, hyperthyroidism, OCD.





Plan


Try clear liquid diet


Appreciate subspecialist input


Home meds


DVT prophylaxis


Full code


Discharge when okay with subspecialist and she will need to see her bariatric 

surgeon at [facility] (Dr. Narvaez)





Comment


Review of Relevant


I have reviewed the following items avril (where applicable) has been applied.


Medications:





Current Medications








 Medications


  (Trade)  Dose


 Ordered  Sig/Colton


 Route


 PRN Reason  Start Time


 Stop Time Status Last Admin


Dose Admin


 


 Morphine Sulfate


  (Morphine


 Sulfate)  2 mg  PRN Q2HR  PRN


 IV


 MODERATE TO SEVERE PAIN  7/6/20 14:00


    7/7/20 06:35





 


 Diphenhydramine


 HCl


  (Benadryl)  25 mg  PRN Q8HRS  PRN


 IVP


 ITCHING  7/6/20 14:00


    7/7/20 06:35





 


 Pantoprazole


 Sodium


  (PROTONIX VIAL


 for IV PUSH)  40 mg  DAILYAC


 IVP


   7/6/20 15:00


    7/7/20 07:17














Justicifation of Admission Dx:


Justifications for Admission:


Justification of Admission Dx:  Yes











WILLA RICO III DO            Jul 7, 2020 11:37

## 2020-07-07 NOTE — PDOC2
NINFA JONES APRN 20 1008:


CONSULT


Date of Consult


Date of Consult


DATE: 20 


TIME: 10:02





Reason for Consult


Reason for Consult:


SBO





Referring Physician


Referring Physician:


ER





Identification/Chief Complaint


Chief Complaint


abdominal pain





Source


Source:  Chart review, Patient





History of Present Illness


Reason for Visit:


2 days of abdominal pain.  Associated nausea, had significant emesis in ER.  

None now.  Pain is improved, intermittent now.  Denies any diarrhea.  Reports 

last stool Friday, no flatus since.  Feels mildly bloated.  Gastric sleeve 4 

years ago, [facility].  No hx SBO.





Past Medical History


Endocrine:  Hypothyroidism





Past Surgical History


Past Surgical History:   (x5), Other (gastric sleeve)





Family History


Family History:  Family History Unknown





Social History


Quit


ALCOHOL:  none


Drugs:  None


Lives:  with Family





Current Problem List


Problem List


Problems


Medical Problems:


(1) Enteritis


Status: Acute  





(2) Pelvic mass in female


Status: Acute  





(3) Small bowel obstruction, partial


Status: Acute  











Current Medications


Current Medications





Current Medications


Sodium Chloride 1,000 ml @  1,000 mls/hr 1X  ONCE IV  Last administered on 

20at 01:10;  Start 20 at 01:30;  Stop 20 at 02:29;  Status DC


Ondansetron HCl (Zofran) 4 mg 1X  ONCE IVP  Last administered on 20at 01:10;

 Start 20 at 01:30;  Stop 20 at 01:31;  Status DC


Morphine Sulfate (Morphine Sulfate) 4 mg 1X  ONCE IV ;  Start 20 at 02:30;  

Stop 20 at 02:31;  Status DC


Ondansetron HCl (Zofran) 4 mg 1X  ONCE IVP  Last administered on 20at 02:37;

 Start 20 at 02:30;  Stop 20 at 02:31;  Status DC


Dicyclomine HCl (Bentyl) 20 mg 1X  ONCE IM  Last administered on 20at 02:21;

 Start 20 at 02:30;  Stop 20 at 02:31;  Status DC


Hydromorphone HCl (Dilaudid) 1 mg 1X  ONCE IM ;  Start 20 at 02:45;  Stop 

20 at 02:46;  Status DC


Hydromorphone HCl (Dilaudid) 1 mg 1X  ONCE IV ;  Start 20 at 03:00;  Stop 

20 at 03:01;  Status DC


Iohexol (Omnipaque 300 Mg/ml) 75 ml 1X  ONCE IV  Last administered on 20at 

03:11;  Start 20 at 03:30;  Stop 20 at 03:31;  Status DC


Info (CONTRAST GIVEN -- Rx MONITORING) 1 each PRN DAILY  PRN MC SEE COMMENTS;  

Start 20 at 03:15;  Stop 20 at 03:14


Ondansetron HCl (Zofran) 4 mg PRN Q8HRS  PRN IV NAUSEA/VOMITING 1ST CHOICE;  

Start 20 at 04:15;  Stop 20 at 04:14;  Status DC


Sodium Chloride 1,000 ml @  125 mls/hr Q8H IV  Last administered on 20at 

08:50;  Start 20 at 06:00


Hydromorphone HCl (Dilaudid) 1 mg PRN Q4HRS  PRN IV SEVERE PAIN 7-10 Last 

administered on 20at 13:35;  Start 20 at 06:00


Morphine Sulfate (Morphine Sulfate) 2 mg PRN Q2HR  PRN IV MODERATE TO SEVERE 

PAIN Last administered on 20at 06:35;  Start 20 at 14:00


Diphenhydramine HCl (Benadryl) 25 mg PRN Q8HRS  PRN IVP ITCHING Last 

administered on 20at 06:35;  Start 20 at 14:00


Pantoprazole Sodium (PROTONIX VIAL for IV PUSH) 40 mg DAILYAC IVP  Last 

administered on 20at 07:17;  Start 20 at 15:00





Active Scripts


Active


Reported


Vyvanse (Lisdexamfetamine Dimesylate) 20 Mg Capsule 1 Cap PO DAILYWBKFT MDD 1 

Capsule(s) 30 Days


Fluoxetine Hcl 10 Mg Tablet 10 Mg PO DAILY


Levothyroxine Sodium 88 Mcg Tablet 1 Tab PO DAILY


Contrave ER 8-90 mg Tablet (Naltrexone HCl/Bupropion HCl) 1 Each Tablet.er 2 Tab

PO BID 30 Days


Cyanocobalamin Injection (Cyanocobalamin (Vitamin B-12)) 1,000 Mcg/1 Ml Vial 1 

Ml IM QMONTH


Unable To Obtain Meds From Prior To Admit (Info)  Each 1 Each 





Allergies


Allergies:  


Coded Allergies:  


     Penicillins (Verified  Allergy, Intermediate, HIVES, 20)


     morphine (Verified  Allergy, Intermediate, hives, 20)





ROS


General:  No: Chills, Other (fevers )


PSYCHOLOGICAL ROS:  No: Anxiety, Depression


Eyes:  No Blurry vision, No Double vision


HEENT:  No: Heacaches, Sore Throat


Hematological and Lymphatic:  No: Bleeding Problems, Blood Clots


Respiratory:  No: Cough, Shortness of breath


Cardiovascular:  No Chest Pain, No Palpitations


Gastrointestinal:  Yes Other (see hpi)


Genitourinary:  No Dysuria, No Hematuria


Musculoskeletal:  No Joint Pain, No Muscle Pain


Neurological:  No Impaired Coord/balance, No Numbness/Tingling


Skin:  No Pruritus, No Rash





Physical Exam


General:  Alert, Oriented X3, Cooperative, No acute distress


HEENT:  Atraumatic, PERRLA


Lungs:  Clear to auscultation, Normal air movement


Heart:  Regular rate, Normal S1, Normal S2


Abdomen:  Soft, No tenderness, Other (ND)


Extremities:  No clubbing, No cyanosis


Skin:  No rashes, No breakdown


Neuro:  Normal gait, Normal speech


Psych/Mental Status:  Mental status NL, Mood NL


MUSCULOSKELETAL:  No deformity, No swelling





Vitals


VITALS





Vital Signs








  Date Time  Temp Pulse Resp B/P (MAP) Pulse Ox O2 Delivery O2 Flow Rate FiO2


 


20 07:51 97.4 73 16 130/46 (74) 97 Room Air  





 97.4       











Labs


Labs





Laboratory Tests








Test


 20


00:48 20


01:30 20


01:54 20


05:30


 


White Blood Count


 10.0 x10^3/uL


(4.0-11.0) 


 


 3.8 x10^3/uL


(4.0-11.0)


 


Red Blood Count


 5.02 x10^6/uL


(3.50-5.40) 


 


 3.98 x10^6/uL


(3.50-5.40)


 


Hemoglobin


 12.9 g/dL


(12.0-15.5) 


 


 10.2 g/dL


(12.0-15.5)


 


Hematocrit


 39.8 %


(36.0-47.0) 


 


 31.9 %


(36.0-47.0)


 


Mean Corpuscular Volume 79 fL ()    80 fL () 


 


Mean Corpuscular Hemoglobin 26 pg (25-35)    26 pg (25-35) 


 


Mean Corpuscular Hemoglobin


Concent 32 g/dL


(31-37) 


 


 32 g/dL


(31-37)


 


Red Cell Distribution Width


 17.7 %


(11.5-14.5) 


 


 17.4 %


(11.5-14.5)


 


Platelet Count


 328 x10^3/uL


(140-400) 


 


 199 x10^3/uL


(140-400)


 


Neutrophils (%) (Auto) 81 % (31-73)    


 


Lymphocytes (%) (Auto) 13 % (24-48)    


 


Monocytes (%) (Auto) 6 % (0-9)    


 


Eosinophils (%) (Auto) 0 % (0-3)    


 


Basophils (%) (Auto) 0 % (0-3)    


 


Neutrophils # (Auto)


 8.1 x10^3/uL


(1.8-7.7) 


 


 





 


Lymphocytes # (Auto)


 1.3 x10^3/uL


(1.0-4.8) 


 


 





 


Monocytes # (Auto)


 0.6 x10^3/uL


(0.0-1.1) 


 


 





 


Eosinophils # (Auto)


 0.0 x10^3/uL


(0.0-0.7) 


 


 





 


Basophils # (Auto)


 0.0 x10^3/uL


(0.0-0.2) 


 


 





 


Sodium Level


 140 mmol/L


(136-145) 


 


 





 


Potassium Level


 3.9 mmol/L


(3.5-5.1) 


 


 





 


Chloride Level


 103 mmol/L


() 


 


 





 


Carbon Dioxide Level


 26 mmol/L


(21-32) 


 


 





 


Anion Gap 11 (6-14)    


 


Blood Urea Nitrogen


 15 mg/dL


(7-20) 


 


 





 


Creatinine


 1.0 mg/dL


(0.6-1.0) 


 


 





 


Estimated GFR


(Cockcroft-Gault) 63.5 


 


 


 





 


BUN/Creatinine Ratio 15 (6-20)    


 


Glucose Level


 125 mg/dL


(70-99) 


 


 





 


Lactic Acid Level


 1.7 mmol/L


(0.4-2.0) 


 


 





 


Calcium Level


 9.0 mg/dL


(8.5-10.1) 


 


 





 


Total Bilirubin


 0.4 mg/dL


(0.2-1.0) 


 


 





 


Aspartate Amino Transf


(AST/SGOT) 19 U/L (15-37) 


 


 


 





 


Alanine Aminotransferase


(ALT/SGPT) 21 U/L (14-59) 


 


 


 





 


Alkaline Phosphatase


 37 U/L


() 


 


 





 


Total Protein


 8.2 g/dL


(6.4-8.2) 


 


 





 


Albumin


 3.8 g/dL


(3.4-5.0) 


 


 





 


Albumin/Globulin Ratio 0.9 (1.0-1.7)    


 


Lipase


 167 U/L


() 


 


 





 


Urine Collection Type  Unknown   


 


Urine Color  Yellow   


 


Urine Clarity  Clear   


 


Urine pH  6.5 (<5.0-8.0)   


 


Urine Specific Gravity


 


 >=1.030


(1.000-1.030) 


 





 


Urine Protein


 


 Negative mg/dL


(NEG-TRACE) 


 





 


Urine Glucose (UA)


 


 Negative mg/dL


(NEG) 


 





 


Urine Ketones (Stick)  40 mg/dL (NEG)   


 


Urine Blood  Moderate (NEG)   


 


Urine Nitrite  Negative (NEG)   


 


Urine Bilirubin  Negative (NEG)   


 


Urine Urobilinogen Dipstick


 


 1.0 mg/dL (0.2


mg/dL) 


 





 


Urine Leukocyte Esterase  Negative (NEG)   


 


Urine RBC  Occ /HPF (0-2)   


 


Urine WBC


 


 5-10 /HPF


(0-4) 


 





 


Urine Squamous Epithelial


Cells 


 Mod /LPF 


 


 





 


Urine Bacteria


 


 Many /HPF


(0-FEW) 


 





 


Urine Mucus  Marked /LPF   


 


Bedside Urine HCG, Qualitative


 


 


 Hcg negative


(Negative) 





 


Test


 20


06:30 


 


 





 


Sodium Level


 142 mmol/L


(136-145) 


 


 





 


Potassium Level


 4.3 mmol/L


(3.5-5.1) 


 


 





 


Chloride Level


 108 mmol/L


() 


 


 





 


Carbon Dioxide Level


 28 mmol/L


(21-32) 


 


 





 


Anion Gap 6 (6-14)    


 


Blood Urea Nitrogen


 10 mg/dL


(7-20) 


 


 





 


Creatinine


 1.1 mg/dL


(0.6-1.0) 


 


 





 


Estimated GFR


(Cockcroft-Gault) 56.9 


 


 


 





 


Glucose Level


 82 mg/dL


(70-99) 


 


 





 


Calcium Level


 7.6 mg/dL


(8.5-10.1) 


 


 





 


Iron Level


 131 ug/dL


() 


 


 





 


Total Iron Binding Capacity


 274 ug/dL


(250-450) 


 


 





 


Iron Saturation 48 % (15-34)    


 


Vitamin B12 Level


 1805 pg/mL


(247-911) 


 


 











Laboratory Tests








Test


 20


05:30 20


06:30


 


White Blood Count


 3.8 x10^3/uL


(4.0-11.0) 





 


Red Blood Count


 3.98 x10^6/uL


(3.50-5.40) 





 


Hemoglobin


 10.2 g/dL


(12.0-15.5) 





 


Hematocrit


 31.9 %


(36.0-47.0) 





 


Mean Corpuscular Volume 80 fL ()  


 


Mean Corpuscular Hemoglobin 26 pg (25-35)  


 


Mean Corpuscular Hemoglobin


Concent 32 g/dL


(31-37) 





 


Red Cell Distribution Width


 17.4 %


(11.5-14.5) 





 


Platelet Count


 199 x10^3/uL


(140-400) 





 


Sodium Level


 


 142 mmol/L


(136-145)


 


Potassium Level


 


 4.3 mmol/L


(3.5-5.1)


 


Chloride Level


 


 108 mmol/L


()


 


Carbon Dioxide Level


 


 28 mmol/L


(21-32)


 


Anion Gap  6 (6-14) 


 


Blood Urea Nitrogen


 


 10 mg/dL


(7-20)


 


Creatinine


 


 1.1 mg/dL


(0.6-1.0)


 


Estimated GFR


(Cockcroft-Gault) 


 56.9 





 


Glucose Level


 


 82 mg/dL


(70-99)


 


Calcium Level


 


 7.6 mg/dL


(8.5-10.1)


 


Iron Level


 


 131 ug/dL


()


 


Total Iron Binding Capacity


 


 274 ug/dL


(250-450)


 


Iron Saturation  48 % (15-34) 


 


Vitamin B12 Level


 


 1805 pg/mL


(247-911)











Assessment/Plan


Assessment/Plan


SBO/enteritis


will check films in AM


if not improving consider SBFT 





CT mentions pelvic lymphadenopathy--MRI at some point to further eval





COLTEN WYATT MD 20 1432:


CONSULT


Assessment/Plan


Assessment/Plan


Patient seen and examined by me she is currently resting comfortably in bed 

denies any nausea vomiting minimal abdominal pain.  Abdomen is soft nondistended

normal active bowel sounds mildly tender in the epigastrium.  Agree with Blancas

assessment plan for bowel rest repeat abdominal films in a.m. hopefully ileus 

that will resolve











NINFA JONES             2020 10:08


COLTEN WYATT MD              2020 14:32

## 2020-07-08 VITALS — SYSTOLIC BLOOD PRESSURE: 131 MMHG | DIASTOLIC BLOOD PRESSURE: 61 MMHG

## 2020-07-08 VITALS — DIASTOLIC BLOOD PRESSURE: 72 MMHG | SYSTOLIC BLOOD PRESSURE: 120 MMHG

## 2020-07-08 VITALS — DIASTOLIC BLOOD PRESSURE: 72 MMHG | SYSTOLIC BLOOD PRESSURE: 116 MMHG

## 2020-07-08 VITALS — SYSTOLIC BLOOD PRESSURE: 117 MMHG | DIASTOLIC BLOOD PRESSURE: 69 MMHG

## 2020-07-08 VITALS — SYSTOLIC BLOOD PRESSURE: 111 MMHG | DIASTOLIC BLOOD PRESSURE: 67 MMHG

## 2020-07-08 VITALS — SYSTOLIC BLOOD PRESSURE: 121 MMHG | DIASTOLIC BLOOD PRESSURE: 70 MMHG

## 2020-07-08 VITALS — DIASTOLIC BLOOD PRESSURE: 52 MMHG | SYSTOLIC BLOOD PRESSURE: 95 MMHG

## 2020-07-08 RX ADMIN — CIPROFLOXACIN HYDROCHLORIDE SCH MG: 250 TABLET, FILM COATED ORAL at 12:00

## 2020-07-08 RX ADMIN — BACITRACIN SCH MLS/HR: 5000 INJECTION, POWDER, FOR SOLUTION INTRAMUSCULAR at 14:00

## 2020-07-08 RX ADMIN — PANTOPRAZOLE SODIUM SCH MG: 40 INJECTION, POWDER, FOR SOLUTION INTRAVENOUS at 07:20

## 2020-07-08 RX ADMIN — BACITRACIN SCH MLS/HR: 5000 INJECTION, POWDER, FOR SOLUTION INTRAMUSCULAR at 02:48

## 2020-07-08 RX ADMIN — BACITRACIN SCH MLS/HR: 5000 INJECTION, POWDER, FOR SOLUTION INTRAMUSCULAR at 10:30

## 2020-07-08 RX ADMIN — MORPHINE SULFATE PRN MG: 2 INJECTION, SOLUTION INTRAMUSCULAR; INTRAVENOUS at 22:18

## 2020-07-08 RX ADMIN — CIPROFLOXACIN HYDROCHLORIDE SCH MG: 250 TABLET, FILM COATED ORAL at 20:14

## 2020-07-08 NOTE — RAD
Examination: PELVIS COMPLETE

 

History: Reason: abnormal CT; Mass seen Left Pelvis / Spl. Instructions:  

/ History: 

 

Comparison/Correlation: CT abdomen and pelvis with contrast 7/6/2020

 

Findings: Transabdominal pelvic ultrasound was performed. Uterus measures 

9 cm x 5.1 cm x 2.9 cm. Myometrium is unremarkable.

 

Right ovary measures 3.1 x 2.2 cm x 2 cm. The left ovary measures 2.4 cm x

2.7 cm x 1.6. Normal flow involving the ovaries is evident on color 

Doppler and spectral Doppler imaging.

 

There is a slightly hypoechoic mass within the lobe pelvic region 

posterior to the uterine cervix corresponding to the finding on CT exam. 

No pelvic free fluid. Urinary bladder is unremarkable.

 

 

Impression:

Mass is present posterior to uterine cervix. It is of indeterminate 

significance. Consider further evaluation with MRI of the pelvis without 

with contrast for further evaluation.

 

Electronically signed by: Won Rice MD (7/8/2020 4:42 PM) [id number]

## 2020-07-08 NOTE — NUR
SW following. Spoke with RN and coordinated care with Dr. Suarez. Pt's discharge held today 
per need to advance diet. Pt will likely discharge home tomorrow, 7/9/2020 home with family. 
SW to continue following.

## 2020-07-08 NOTE — PDOC
NINFA JONES APRN 7/8/20 0951:


SURGICAL PROGRESS NOTE


Subjective


no flatus, had clears-seemed to bloat her some


pain off and on


Vital Signs





Vital Signs








  Date Time  Temp Pulse Resp B/P (MAP) Pulse Ox O2 Delivery O2 Flow Rate FiO2


 


7/8/20 07:31      Room Air  


 


7/8/20 07:00 98.2 72 17 111/67 (82) 99   





 98.2       








I&O











Intake and Output 


 


 7/8/20





 07:00


 


Intake Total 2550 ml


 


Balance 2550 ml


 


 


 


Intake Oral 550 ml


 


IV Total 2000 ml


 


# Voids 5








General:  Alert, Oriented X3, Cooperative


Abdomen:  Soft, Other (mildly distended )


Labs





Laboratory Tests








Test


 7/7/20


05:30 7/7/20


06:30


 


White Blood Count


 3.8 x10^3/uL


(4.0-11.0) 





 


Red Blood Count


 3.98 x10^6/uL


(3.50-5.40) 





 


Hemoglobin


 10.2 g/dL


(12.0-15.5) 





 


Hematocrit


 31.9 %


(36.0-47.0) 





 


Mean Corpuscular Volume 80 fL ()  


 


Mean Corpuscular Hemoglobin 26 pg (25-35)  


 


Mean Corpuscular Hemoglobin


Concent 32 g/dL


(31-37) 





 


Red Cell Distribution Width


 17.4 %


(11.5-14.5) 





 


Platelet Count


 199 x10^3/uL


(140-400) 





 


Sodium Level


 


 142 mmol/L


(136-145)


 


Potassium Level


 


 4.3 mmol/L


(3.5-5.1)


 


Chloride Level


 


 108 mmol/L


()


 


Carbon Dioxide Level


 


 28 mmol/L


(21-32)


 


Anion Gap  6 (6-14) 


 


Blood Urea Nitrogen


 


 10 mg/dL


(7-20)


 


Creatinine


 


 1.1 mg/dL


(0.6-1.0)


 


Estimated GFR


(Cockcroft-Gault) 


 56.9 





 


Glucose Level


 


 82 mg/dL


(70-99)


 


Calcium Level


 


 7.6 mg/dL


(8.5-10.1)


 


Iron Level


 


 131 ug/dL


()


 


Total Iron Binding Capacity


 


 274 ug/dL


(250-450)


 


Iron Saturation  48 % (15-34) 


 


Vitamin B12 Level


 


 1805 pg/mL


(247-911)








Problem List


Problems


Medical Problems:


(1) Enteritis


Status: Acute  





(2) Pelvic mass in female


Status: Acute  





(3) Small bowel obstruction, partial


Status: Acute  








Assessment/Plan


xray pending





noted uti on cultures--defer to [facility]





Justicifation of Admission Dx:


Justifications for Admission:


Justification of Admission Dx:  Yes





COLTEN WYATT MD 7/8/20 1245:


SURGICAL PROGRESS NOTE


Assessment/Plan


Patient seen and examined by me currently resting comfortably in bed denies any 

nausea or vomiting does feel little bloated after having clear liquid diet.  She

denies flatus or bowel movement.  Awaiting final report on abdominal films if 

improved would recommend to Glock suppository.  Agree with Magalis assessment 

and plan











NINFA JONES             Jul 8, 2020 09:51


COLTEN WYATT MD              Jul 8, 2020 12:45

## 2020-07-08 NOTE — RAD
Acute abdominal series to include an AP chest radiograph 7/8/2020

 

Clinical History: Small bowel obstruction.

 

An AP portable erect digital radiograph of the chest was obtained. Two AP 

supine and an erect AP digital radiographs of the abdomen/pelvis were 

obtained.

 

Comparison is made to the patient's CT scan of the abdomen and pelvis 

dated 7/6/2020.

 

The cardiac and mediastinal silhouettes are within normal limits in size 

and configuration. No pulmonary infiltrate is seen. No pleural effusion or

pneumothorax is noted.

 

A moderate amount stool seen throughout the colon. The air distention of 

the bowel appears improved since the patient's CT scan. No free air is 

noted. No radiopaque calculus is seen. The osseous structures are 

unchanged.

 

IMPRESSION: Interval improvement in the air distention bowel loops since 

the patient's recent CT scan.

 

Electronically signed by: Doron Acuna MD (7/8/2020 3:16 PM) [id number]

## 2020-07-08 NOTE — NUR
Pt transferred by wheelchair from room 668 to room 440. Transfer report given to LALO Martinez. 
Belongings sent with pt at time of transfer.

## 2020-07-08 NOTE — PDOC
Subjective:


Subjective:


Hungry, a little frustrated hasn't even had liquids today - tolerating liquids 

yesterday but had some bloating this morning.


Trying to take less pain meds.  Epigastric pain 5/10 today - down from 9/10 

before.


No flatus or stool, no vomiting.





Objective:


Objective:


Nurse called - pt and  "getting cranky" - she's hungry, still awaiting 

imaging - gave okay to resume clears.


Vital Signs:





                                   Vital Signs








  Date Time  Temp Pulse Resp B/P (MAP) Pulse Ox O2 Delivery O2 Flow Rate FiO2


 


7/8/20 10:52 98.0 55 17 117/69 (85) 100 Room Air  





 98.0       








Labs:





Laboratory Tests








Test


 7/8/20


12:08


 


Glucose (Fingerstick)


 62 mg/dL


(70-99)





  URINE CULTURE  Final  


        Final





        GREATER THAN 100,000 CFU/ML GRAM NEGATIVE RODS on 07/07/20


        at 0708


        FINAL ID= [[id number]]


Imaging:


AAS 7/8


pending





PE:





GEN: NAD -  present


LUNGS: CTAB


HEART: RRR


ABD: quiet BS - less active today, soft, epigastric discomfort


NEURO/PSYCH: A & O 3, cooperative and pleasant





A/P:


Upper abdominal pain - better?


UTI - per Dr. Suraez


Abnormal CT w/ enteritis vs pSBO, fullness in left hemipelvis, lymphadenopathy -

surgery following


S/p gastric sleeve


COVID-19 negative 7/7





--


Significant time spent.


Await x-ray and pelv US.





Justicifation of Admission Dx:


Justifications for Admission:


Justification of Admission Dx:  Yes











JOEY TOPETE          Jul 8, 2020 13:19

## 2020-07-08 NOTE — PDOC
[facility] PROGRESS NOTE


Chief Complaint


Chief Complaint


Abdominal pain


Enteritis


Possible SBO


gastric sleeve, hyperthyroidism, OCD.





History of Present Illness


History of Present Illness


7/8/2020


Patient seen and examined


She seems to be feeling better


She tolerated clear liquid diet yesterday but that is on hold for more imaging








7/7/2020


Patient seen and examined


She feels little better today


Chart reviewed


Discussed with RN


Working to try clear liquids





Vitals/I&O


Vitals/I&O:





                                   Vital Signs








  Date Time  Temp Pulse Resp B/P (MAP) Pulse Ox O2 Delivery O2 Flow Rate FiO2


 


7/8/20 10:52 98.0 55 17 117/69 (85) 100 Room Air  





 98.0       














                                    I & O   


 


 7/7/20 7/7/20 7/8/20





 15:00 23:00 07:00


 


Intake Total 1000 ml 1250 ml 300 ml


 


Balance 1000 ml 1250 ml 300 ml











Physical Exam


General:  Alert, Oriented X3, Cooperative


Heart:  Regular rate, Normal S1, Normal S2


Abdomen:  Soft, Other (mildly distended )


Extremities:  No clubbing, No cyanosis


Skin:  No rashes, No breakdown





Assessment and Plan


Assessmemt and Plan


Problems


Medical Problems:


(1) Enteritis


Status: Acute  





(2) Pelvic mass in female


Status: Acute  





(3) Small bowel obstruction, partial


Status: Acut


Resolving enteritis and small bowel obstruction and [place] female who had a 

gastric sleeve 4 years ago at [facility]





Plan


Clinically she seems cleared to try a clear liquid diet and advance her diet if 

tolerates


Appreciate general surgery input hopefully the imaging looks good (results are 

pending)


If we are able to advance her diet would like to discharge this evening if 

possible


For now continue current care








Comment


Review of Relevant


I have reviewed the following items avril (where applicable) has been applied.





Justicifation of Admission Dx:


Justifications for Admission:


Justification of Admission Dx:  Yes











WILLA RICO III DO            Jul 8, 2020 11:45

## 2020-07-09 VITALS — SYSTOLIC BLOOD PRESSURE: 121 MMHG | DIASTOLIC BLOOD PRESSURE: 76 MMHG

## 2020-07-09 VITALS — DIASTOLIC BLOOD PRESSURE: 87 MMHG | SYSTOLIC BLOOD PRESSURE: 133 MMHG

## 2020-07-09 VITALS — SYSTOLIC BLOOD PRESSURE: 122 MMHG | DIASTOLIC BLOOD PRESSURE: 75 MMHG

## 2020-07-09 VITALS — SYSTOLIC BLOOD PRESSURE: 110 MMHG | DIASTOLIC BLOOD PRESSURE: 45 MMHG

## 2020-07-09 VITALS — SYSTOLIC BLOOD PRESSURE: 103 MMHG | DIASTOLIC BLOOD PRESSURE: 45 MMHG

## 2020-07-09 VITALS — SYSTOLIC BLOOD PRESSURE: 113 MMHG | DIASTOLIC BLOOD PRESSURE: 64 MMHG

## 2020-07-09 RX ADMIN — BACITRACIN SCH MLS/HR: 5000 INJECTION, POWDER, FOR SOLUTION INTRAMUSCULAR at 09:47

## 2020-07-09 RX ADMIN — CIPROFLOXACIN HYDROCHLORIDE SCH MG: 250 TABLET, FILM COATED ORAL at 09:43

## 2020-07-09 RX ADMIN — BACITRACIN SCH MLS/HR: 5000 INJECTION, POWDER, FOR SOLUTION INTRAMUSCULAR at 03:54

## 2020-07-09 RX ADMIN — BACITRACIN SCH MLS/HR: 5000 INJECTION, POWDER, FOR SOLUTION INTRAMUSCULAR at 14:00

## 2020-07-09 RX ADMIN — LEVOTHYROXINE SODIUM SCH MCG: 88 TABLET ORAL at 13:00

## 2020-07-09 RX ADMIN — CIPROFLOXACIN HYDROCHLORIDE SCH MG: 250 TABLET, FILM COATED ORAL at 20:44

## 2020-07-09 RX ADMIN — BACITRACIN SCH MLS/HR: 5000 INJECTION, POWDER, FOR SOLUTION INTRAMUSCULAR at 22:00

## 2020-07-09 RX ADMIN — PANTOPRAZOLE SODIUM SCH MG: 40 INJECTION, POWDER, FOR SOLUTION INTRAVENOUS at 05:46

## 2020-07-09 NOTE — PDOC
PROGRESS NOTES


Chief Complaint


Chief Complaint





IMPRESSION 








Abdominal pain


Enteritis


Possible SBO


gastric sleeve, hyperthyroidism, OCD.


Mass is present posterior to uterine cervix. It is of indeterminate  signi

ficance. Consider further evaluation with MRI of the pelvis without  with 

contrast for further evaluation








39 MIN PT EXAM, CHART REVIEW, > 50% OF TIME SPENT WITH EXAM, CHART REVIEW, PT 

CARE COORDINATION.





History of Present Illness


History of Present Illness


7/9/2020


Patient seen and examined


She seems to be feeling better


PELVIC MASS NOTED ON SONO 7/9 


She tolerated clear liquid diet yesterday but that is on hold for more imaging








7/7/2020


Patient seen and examined


She feels little better today


Chart reviewed


Discussed with RN


Working to try clear liquids





Vitals


Vitals





Vital Signs








  Date Time  Temp Pulse Resp B/P (MAP) Pulse Ox O2 Delivery O2 Flow Rate FiO2


 


7/9/20 07:00 98.7 59 18 110/45 (66) 98 Room Air  





 98.7       











Physical Exam


General:  Alert, Oriented X3, Cooperative, No acute distress


Heart:  Regular rate, Normal S1, Normal S2


Lungs:  Clear


Abdomen:  Normal bowel sounds, Soft, No tenderness


Extremities:  No clubbing, No cyanosis


Skin:  No rashes, No breakdown





Labs


LABS





Examination: PELVIS COMPLETE


 


History: Reason: abnormal CT; Mass seen Left Pelvis / Spl. Instructions:  


/ History: 


 


Comparison/Correlation: CT abdomen and pelvis with contrast 7/6/2020


 


Findings: Transabdominal pelvic ultrasound was performed. Uterus measures 


9 cm x 5.1 cm x 2.9 cm. Myometrium is unremarkable.


 


Right ovary measures 3.1 x 2.2 cm x 2 cm. The left ovary measures 2.4 cm x


2.7 cm x 1.6. Normal flow involving the ovaries is evident on color 


Doppler and spectral Doppler imaging.


 


There is a slightly hypoechoic mass within the lobe pelvic region 


posterior to the uterine cervix corresponding to the finding on CT exam. 


No pelvic free fluid. Urinary bladder is unremarkable.


 


 


Impression:


Mass is present posterior to uterine cervix. It is of indeterminate 


significance. Consider further evaluation with MRI of the pelvis without 


with contrast for further evaluation.


 


Electronically signed by: Trinidad Krishna MD (7/8/2020 4:42 PM) [id number]














DICTATED and SIGNED BY:     TRINIDAD KRISHNA MD


DATE:     07/08/20 1642





Laboratory Tests








Test


 7/8/20


12:08


 


Glucose (Fingerstick)


 62 mg/dL


(70-99)











Assessment and Plan


Assessmemt and Plan


Problems


Medical Problems:


(1) Enteritis


Status: Acute  





(2) Pelvic mass in female


Status: Acute  





(3) Small bowel obstruction, partial


Status: Acute  











Comment


Review of Relevant


I have reviewed the following items avril (where applicable) has been applied.


Labs





Laboratory Tests








Test


 7/7/20


18:00 7/8/20


12:08


 


Coronavirus (COVID-19)(PCR)


 Negative


(NEGATIVE) 





 


Glucose (Fingerstick)


 


 62 mg/dL


(70-99)








Laboratory Tests








Test


 7/8/20


12:08


 


Glucose (Fingerstick)


 62 mg/dL


(70-99)








Microbiology


7/6/20 Urine Culture - Final, Complete


         


7/6/20 Antimicrobic Susceptibility - Final, Complete


Medications





Current Medications


Sodium Chloride 1,000 ml @  1,000 mls/hr 1X  ONCE IV  Last administered on 

7/6/20at 01:10;  Start 7/6/20 at 01:30;  Stop 7/6/20 at 02:29;  Status DC


Ondansetron HCl (Zofran) 4 mg 1X  ONCE IVP  Last administered on 7/6/20at 01:10;

 Start 7/6/20 at 01:30;  Stop 7/6/20 at 01:31;  Status DC


Morphine Sulfate (Morphine Sulfate) 4 mg 1X  ONCE IV ;  Start 7/6/20 at 02:30;  

Stop 7/6/20 at 02:31;  Status DC


Ondansetron HCl (Zofran) 4 mg 1X  ONCE IVP  Last administered on 7/6/20at 02:37;

 Start 7/6/20 at 02:30;  Stop 7/6/20 at 02:31;  Status DC


Dicyclomine HCl (Bentyl) 20 mg 1X  ONCE IM  Last administered on 7/6/20at 02:21;

 Start 7/6/20 at 02:30;  Stop 7/6/20 at 02:31;  Status DC


Hydromorphone HCl (Dilaudid) 1 mg 1X  ONCE IM ;  Start 7/6/20 at 02:45;  Stop 

7/6/20 at 02:46;  Status DC


Hydromorphone HCl (Dilaudid) 1 mg 1X  ONCE IV ;  Start 7/6/20 at 03:00;  Stop 

7/6/20 at 03:01;  Status DC


Iohexol (Omnipaque 300 Mg/ml) 75 ml 1X  ONCE IV  Last administered on 7/6/20at 

03:11;  Start 7/6/20 at 03:30;  Stop 7/6/20 at 03:31;  Status DC


Info (CONTRAST GIVEN -- Rx MONITORING) 1 each PRN DAILY  PRN MC SEE COMMENTS;  

Start 7/6/20 at 03:15;  Stop 7/8/20 at 03:14;  Status DC


Ondansetron HCl (Zofran) 4 mg PRN Q8HRS  PRN IV NAUSEA/VOMITING 1ST CHOICE;  

Start 7/6/20 at 04:15;  Stop 7/7/20 at 04:14;  Status DC


Sodium Chloride 1,000 ml @  125 mls/hr Q8H IV  Last administered on 7/9/20at 

09:47;  Start 7/6/20 at 06:00


Hydromorphone HCl (Dilaudid) 1 mg PRN Q4HRS  PRN IV SEVERE PAIN 7-10 Last 

administered on 7/6/20at 13:35;  Start 7/6/20 at 06:00


Morphine Sulfate (Morphine Sulfate) 2 mg PRN Q2HR  PRN IV MODERATE TO SEVERE 

PAIN Last administered on 7/8/20at 22:18;  Start 7/6/20 at 14:00


Diphenhydramine HCl (Benadryl) 25 mg PRN Q8HRS  PRN IVP ITCHING Last 

administered on 7/8/20at 22:17;  Start 7/6/20 at 14:00


Pantoprazole Sodium (PROTONIX VIAL for IV PUSH) 40 mg DAILYAC IVP  Last 

administered on 7/9/20at 05:46;  Start 7/6/20 at 15:00


Ciprofloxacin (Cipro) 250 mg BID PO  Last administered on 7/9/20at 09:43;  Start

7/8/20 at 12:00


Bisacodyl (Dulcolax Supp) 10 mg 1X  ONCE MS ;  Start 7/9/20 at 10:30;  Stop 7/ 9/20 at 10:35;  Status DC





Active Scripts


Active


Reported


Vyvanse (Lisdexamfetamine Dimesylate) 20 Mg Capsule 1 Cap PO DAILYWBKFT MDD 1 

Capsule(s) 30 Days


Fluoxetine Hcl 10 Mg Tablet 10 Mg PO DAILY


Levothyroxine Sodium 88 Mcg Tablet 1 Tab PO DAILY


Contrave ER 8-90 mg Tablet (Naltrexone HCl/Bupropion HCl) 1 Each Tablet.er 2 Tab

PO BID 30 Days


Cyanocobalamin Injection (Cyanocobalamin (Vitamin B-12)) 1,000 Mcg/1 Ml Vial 1 

Ml IM QMONTH


Unable To Obtain Meds From Prior To Admit (Info)  Each 1 Each 


Vitals/I & O





Vital Sign - Last 24 Hours








 7/8/20 7/8/20 7/8/20 7/8/20





 14:54 15:21 19:00 20:00


 


Temp 98.0 98.2 97.8 





 98.0 98.2 97.8 


 


Pulse 60 66 66 


 


Resp 17 17 18 


 


B/P (MAP) 121/70 (87) 116/72 (87) 131/61 (84) 


 


Pulse Ox 100 99 100 


 


O2 Delivery Room Air Room Air Room Air Room Air


 


    





    





 7/8/20 7/8/20 7/8/20 7/9/20





 22:18 22:48 23:00 03:00


 


Temp   98.2 98.2





   98.2 98.2


 


Pulse   65 55


 


Resp 20 20 18 18


 


B/P (MAP)   95/52 (66) 103/45 (64)


 


Pulse Ox 100 98 98 99


 


O2 Delivery Room Air Room Air Room Air Room Air


 


    





    





 7/9/20   





 07:00   


 


Temp 98.7   





 98.7   


 


Pulse 59   


 


Resp 18   


 


B/P (MAP) 110/45 (66)   


 


Pulse Ox 98   


 


O2 Delivery Room Air   














Intake and Output   


 


 7/8/20 7/8/20 7/9/20





 15:00 23:00 07:00


 


Intake Total 1000 ml  


 


Balance 1000 ml  











Justicifation of Admission Dx:


Justifications for Admission:


Justification of Admission Dx:  Yes











COLTEN AGUILAR MD           Jul 9, 2020 11:06

## 2020-07-09 NOTE — PDOC4
OPERATIVE NOTE:


Speculum placed to visualized cervix.  Cervix displaced to the right.  Cervix 

appears nml and smooth.  No lesions seen.  Pap perform and sent to lab.











PROMISE MOON MD              Jul 9, 2020 11:48

## 2020-07-09 NOTE — PDOC2
CONSULT


Date of Consult


Date of Consult


DATE: 20 


TIME: 10:19





Reason for Consult


Reason for Consult:


pelvic mass





History of Present Illness


Reason for Visit:


The pt is a 34y  admitted for abd pain.  The pt was seen in the ER on 

for N/V/D and abd pain.  The pt has a hx significant for a gastric bypass 4yrs 

ago.  She was dxed with SBO/enteritis and has been expectant managed.  Since 

admission her pain has improved.  They are advance her diet today.  During the 

course of her w/u the pt was found to have a pelvic mass.  In the ER a CT was 

performed 20 with the following findings:





   Poorly characterized area of masslike fullness in the left hemipelvis 


   with adjacent pelvic lymphadenopathy. Imaging characteristics are 


   nonspecific, but considerations would include conglomerate 


   lymphadenopathy, rectosigmoid mass, or exophytic uterine/cervical mass. 


   Contrast-enhanced pelvic MRI may provide additional information.





   Expansile lytic process involving the left superior pubic ramus and 


   extending to the medial wall of the left acetabulum. This is 


   indeterminate, however given the presence of the pelvic mass and pelvic 


   lymphadenopathy a neoplastic process would be a consideration.





An u/s performed on  defined the mass as follows:





   Mass is present posterior to uterine cervix. It is of indeterminate 


   significance. Consider further evaluation with MRI of the pelvis without 


   with contrast for further evaluation.





Discussed case with radiologist.  Based on the imaging they are concerned for 

malignancy favor cervical (maybe rectal).  The pt has not seen a Gyn for some 

time.  The pt can not recall her last pap.  She has a PCP, but they do not 

perform paps.





OBHx: TC/S x 5 (first for failure to dilate), AB x 1





Gyn: LMP 2wks ago


Can not recall last pap


menarche at 13yo


regular periods





Past Medical History


Endocrine:  Hypothyroidism





Past Surgical History


Past Surgical History:   (x5), Other (gastric sleeve)





Family History


Family History:  Family History Unknown





Social History


Quit


ALCOHOL:  none


Drugs:  None


Lives:  with Family





Current Problem List


Problem List


Problems


Medical Problems:


(1) Enteritis


Status: Acute  





(2) Pelvic mass in female


Status: Acute  





(3) Small bowel obstruction, partial


Status: Acute  











Current Medications


Current Medications





Current Medications


Sodium Chloride 1,000 ml @  1,000 mls/hr 1X  ONCE IV  Last administered on 

20at 01:10;  Start 20 at 01:30;  Stop 20 at 02:29;  Status DC


Ondansetron HCl (Zofran) 4 mg 1X  ONCE IVP  Last administered on 20at 01:10;

 Start 20 at 01:30;  Stop 20 at 01:31;  Status DC


Morphine Sulfate (Morphine Sulfate) 4 mg 1X  ONCE IV ;  Start 20 at 02:30;  

Stop 20 at 02:31;  Status DC


Ondansetron HCl (Zofran) 4 mg 1X  ONCE IVP  Last administered on 20at 02:37;

 Start 20 at 02:30;  Stop 20 at 02:31;  Status DC


Dicyclomine HCl (Bentyl) 20 mg 1X  ONCE IM  Last administered on 20at 02:21;

 Start 20 at 02:30;  Stop 20 at 02:31;  Status DC


Hydromorphone HCl (Dilaudid) 1 mg 1X  ONCE IM ;  Start 20 at 02:45;  Stop 

20 at 02:46;  Status DC


Hydromorphone HCl (Dilaudid) 1 mg 1X  ONCE IV ;  Start 20 at 03:00;  Stop 

20 at 03:01;  Status DC


Iohexol (Omnipaque 300 Mg/ml) 75 ml 1X  ONCE IV  Last administered on 20at 

03:11;  Start 20 at 03:30;  Stop 20 at 03:31;  Status DC


Info (CONTRAST GIVEN -- Rx MONITORING) 1 each PRN DAILY  PRN MC SEE COMMENTS;  

Start 20 at 03:15;  Stop 20 at 03:14;  Status DC


Ondansetron HCl (Zofran) 4 mg PRN Q8HRS  PRN IV NAUSEA/VOMITING 1ST CHOICE;  

Start 20 at 04:15;  Stop 20 at 04:14;  Status DC


Sodium Chloride 1,000 ml @  125 mls/hr Q8H IV  Last administered on 20at 

09:47;  Start 20 at 06:00


Hydromorphone HCl (Dilaudid) 1 mg PRN Q4HRS  PRN IV SEVERE PAIN 7-10 Last 

administered on 20at 13:35;  Start 20 at 06:00


Morphine Sulfate (Morphine Sulfate) 2 mg PRN Q2HR  PRN IV MODERATE TO SEVERE 

PAIN Last administered on 20at 22:18;  Start 20 at 14:00


Diphenhydramine HCl (Benadryl) 25 mg PRN Q8HRS  PRN IVP ITCHING Last 

administered on  22:17;  Start 20 at 14:00


Pantoprazole Sodium (PROTONIX VIAL for IV PUSH) 40 mg DAILYAC IVP  Last 

administered on  05:46;  Start 20 at 15:00


Ciprofloxacin (Cipro) 250 mg BID PO  Last administered on  09:43;  Start

20 at 12:00





Active Scripts


Active


Reported


Vyvanse (Lisdexamfetamine Dimesylate) 20 Mg Capsule 1 Cap PO DAILYWBKFT MDD 1 

Capsule(s) 30 Days


Fluoxetine Hcl 10 Mg Tablet 10 Mg PO DAILY


Levothyroxine Sodium 88 Mcg Tablet 1 Tab PO DAILY


Contrave ER 8-90 mg Tablet (Naltrexone HCl/Bupropion HCl) 1 Each Tablet.er 2 Tab

PO BID 30 Days


Cyanocobalamin Injection (Cyanocobalamin (Vitamin B-12)) 1,000 Mcg/1 Ml Vial 1 

Ml IM QMONTH


Unable To Obtain Meds From Prior To Admit (Info)  Each 1 Each 





Allergies


Allergies:  


Coded Allergies:  


     Penicillins (Verified  Allergy, Intermediate, HIVES, 20)


     morphine (Verified  Allergy, Intermediate, hives, 20)





Vitals


VITALS





Vital Signs








  Date Time  Temp Pulse Resp B/P (MAP) Pulse Ox O2 Delivery O2 Flow Rate FiO2


 


20 07:00 98.7 59 18 110/45 (66) 98 Room Air  





 98.7       











Labs


Labs





Laboratory Tests








Test


 20


18:00 20


12:08


 


Coronavirus (COVID-19)(PCR)


 Negative


(NEGATIVE) 





 


Glucose (Fingerstick)


 


 62 mg/dL


(70-99)








Laboratory Tests








Test


 20


12:08


 


Glucose (Fingerstick)


 62 mg/dL


(70-99)











Assessment/Plan


Assessment/Plan


Assessment: 


34y  admitted for SBO with pelvic mass





Recommendation:


1.) Pelvic mass  concerns of malignancy.  Will perform pap (and bx if 

appearance of cervix concerning).  Will perform in procedure room today.  


2.) Abd pain  improved, managed by primary team, GI, and Surg


3.) SBO  as above


4.) Contraception  BTL


5.) H/o gastric bypass


6.) H/o C/S x 5


7.) Gyn hx  no recent paps


8.) Will continue to follow











PROMISE MOON MD              2020 10:21

## 2020-07-09 NOTE — NUR
SW following. Reviewed chart and spoke with RN and CM. Pt had a bowel movement today with 
plan to continue to attempt to advance pt's diet. Pt also to have a biopsy of cyst per RN. 
Pt from home and will discharge when stable. SW to continue following.

## 2020-07-09 NOTE — PDOC
SURGICAL PROGRESS NOTE


Subjective


Patient feeling fine no real complaints has not had a bowel movement but is 

passing flatus


Vital Signs





Vital Signs








  Date Time  Temp Pulse Resp B/P (MAP) Pulse Ox O2 Delivery O2 Flow Rate FiO2


 


7/9/20 07:00 98.7 59 18 110/45 (66) 98 Room Air  





 98.7       








I&O











Intake and Output 


 


 7/9/20





 07:00


 


Intake Total 1000 ml


 


Balance 1000 ml


 


 


 


Intake Oral 0 ml


 


IV Total 1000 ml


 


# Voids 5








PATIENT HAS A MUNOZ:  No


General:  Alert, Oriented X3, Cooperative, No acute distress


Abdomen:  Normal bowel sounds, Soft, No tenderness


Labs





Laboratory Tests








Test


 7/7/20


18:00 7/8/20


12:08


 


Coronavirus (COVID-19)(PCR)


 Negative


(NEGATIVE) 





 


Glucose (Fingerstick)


 


 62 mg/dL


(70-99)








Laboratory Tests








Test


 7/8/20


12:08


 


Glucose (Fingerstick)


 62 mg/dL


(70-99)








I have reviewed the following


Abdominal films show improvement no gaseous distention air in the colon


Vaginal ultrasound showing a mass in the pelvis


Problem List


Problems


Medical Problems:


(1) Enteritis


Status: Acute  





(2) Pelvic mass in female


Status: Acute  





(3) Small bowel obstruction, partial


Status: Acute  








Assessment/Plan


Ileus versus small bowel obstruction with patient passing flatus and improved x-

ray will try a Dulcolax suppository and advance diet as tolerated





Justicifation of Admission Dx:


Justifications for Admission:


Justification of Admission Dx:  Yes











COLTEN WYATT MD              Jul 9, 2020 10:38

## 2020-07-10 VITALS — SYSTOLIC BLOOD PRESSURE: 116 MMHG | DIASTOLIC BLOOD PRESSURE: 81 MMHG

## 2020-07-10 VITALS — SYSTOLIC BLOOD PRESSURE: 145 MMHG | DIASTOLIC BLOOD PRESSURE: 58 MMHG

## 2020-07-10 VITALS — DIASTOLIC BLOOD PRESSURE: 72 MMHG | SYSTOLIC BLOOD PRESSURE: 124 MMHG

## 2020-07-10 VITALS — SYSTOLIC BLOOD PRESSURE: 102 MMHG | DIASTOLIC BLOOD PRESSURE: 59 MMHG

## 2020-07-10 RX ADMIN — LEVOTHYROXINE SODIUM SCH MCG: 88 TABLET ORAL at 09:00

## 2020-07-10 RX ADMIN — CIPROFLOXACIN HYDROCHLORIDE SCH MG: 250 TABLET, FILM COATED ORAL at 09:06

## 2020-07-10 RX ADMIN — PANTOPRAZOLE SODIUM SCH MG: 40 TABLET, DELAYED RELEASE ORAL at 06:37

## 2020-07-10 RX ADMIN — PANTOPRAZOLE SODIUM SCH MG: 40 INJECTION, POWDER, FOR SOLUTION INTRAVENOUS at 06:10

## 2020-07-10 RX ADMIN — LEVOTHYROXINE SODIUM SCH MCG: 88 TABLET ORAL at 09:06

## 2020-07-10 RX ADMIN — PANTOPRAZOLE SODIUM SCH MG: 40 TABLET, DELAYED RELEASE ORAL at 09:06

## 2020-07-10 NOTE — PDOC
SURGICAL PROGRESS NOTE


Subjective


Patient states she is doing well today has had a couple of bowel movements 

tolerating diet


Vital Signs





Vital Signs








  Date Time  Temp Pulse Resp B/P (MAP) Pulse Ox O2 Delivery O2 Flow Rate FiO2


 


7/10/20 07:00 97.9 61 17 145/58 (87) 100 Room Air  





 97.9       








I&O











Intake and Output 


 


 7/10/20





 06:59


 


Intake Total 440 ml


 


Balance 440 ml


 


 


 


Intake Oral 440 ml


 


# Voids 4


 


# Bowel Movements 1








PATIENT HAS A MUNOZ:  No


General:  Alert, Oriented X3, Cooperative, No acute distress


Abdomen:  Normal bowel sounds, Soft, No tenderness


Labs





Laboratory Tests








Test


 7/8/20


12:08


 


Glucose (Fingerstick)


 62 mg/dL


(70-99)








Problem List


Problems


Medical Problems:


(1) Enteritis


Status: Acute  





(2) Pelvic mass in female


Status: Acute  





(3) Small bowel obstruction, partial


Status: Acute  








Assessment/Plan


Resolution of her ileus having bowel movements tolerating diet


No surgical plans





Justicifation of Admission Dx:


Justifications for Admission:


Justification of Admission Dx:  Yes











COLTEN WYATT MD             Jul 10, 2020 09:51

## 2020-07-10 NOTE — DS
DATE OF DISCHARGE:  07/10/2020



ADMISSION DIAGNOSES:  Abdominal pain, enteritis, possible small-bowel

obstruction, history of gastric sleeve.



DISCHARGE DIAGNOSES:  Resolving abdominal pain, resolving probable small-bowel

obstruction with incidental finding of a small pelvic mass.



CONSULTS:  Dr. Jay Kim, OB/GYN; Dr. Tucker, GI; and Dr. Martínez, General

Surgery.



PROCEDURES:  None.



HOSPITAL COURSE:  The patient is a pleasant middle-aged female who presented

with abdominal pain.  She had a gastric sleeve 4 years ago.  We were concerned

this was probably somehow related to her history of gastric sleeve, but we went

ahead and admitted her, the above consults were obtained.  We gave her

antiemetics and fluids.  Over the past few days, she has done much better.  This

morning, I saw her and examined her.  Heart tones are normal.  Lungs are clear 

Abdomen was soft.  She has advanced her diet.  She looks great.  The consultants

were okay with the going home.  I just spoke with Dr. Jay Kim few minutes

ago.  He is concerned about making sure she gets follow up for this incidental

discovery of a small pelvic mass that is 3.1 x 2.2 x 2 cm.  Dr. Jay Kim

felt like she probably needs to have a colonoscopy to see if it is probably

attached to her rectum, because he really did not see much on the pelvic exam. 

Overall, she looks great.  We are going to discharge.  I gave orders for the

nurse to discharge her and make sure the patient follows up with GI to consider

endoscopy and she is also going to follow up with Dr. Jay Kim as well as

her primary care doctor.



DISPOSITION:  Home.



ACTIVITY:  As tolerated.



DIET:  Low sodium.



MEDICATIONS:  Please see the MRAD.



TOTAL TIME:  34 minutes.

 



______________________________

DIMPLEL ANAYELI RICO DO DR:  MEHUL/terrence  JOB#:  [id number] / [id number]

DD:  07/10/2020 12:45  DT:  07/10/2020 12:51

## 2020-07-10 NOTE — NUR
SW following. Spoke with RN and coordinated care with Dr. Benito. Pt clear for discharge 
home with family. Pt on oral medications and room air. No further SW needs at this time.

## 2020-07-10 NOTE — DS
DATE OF DISCHARGE:  



ADMISSION DIAGNOSES:  Enteritis and partial small bowel obstruction, history of

gastric sleeve.



DISCHARGE DIAGNOSES:  Resolving abdominal pain, resolving small bowel

obstruction, resolving enteritis, incidental finding of a posterior cervical

mass (right ovarian mass 3.1 x 2.2 x 2 cm).



CONSULTS:  OB/GYN and also GI and General Surgery.



PROCEDURES:  None.



HISTORY COURSE:  The patient is a pleasant middle-aged female who presented

with.



DICTATION ENDS HERE.

 



______________________________

WILLA RICO DO



DR:  MEHUL/terrence  JOB#:  [id number] / [id number]

DD:  07/10/2020 12:37  DT:  07/10/2020 12:42

## 2020-07-10 NOTE — PDOC
OB/GYN PROGRESS NOTE


Subjective:


Informed the pt that based on the appearance her cervix is not likely the source

of the mass.  Since this is the case, finding the best way to eval the mass is 

the next step.  Radiology suggested a MRI, which may help.  It would also be 

good to have an eval of rectum and colon which possibly could be done by GI or 

surgery.  The pt states that the advancement of her diet went ok.





Objective:


Vital Signs:





Vital Signs








  Date Time  Temp Pulse Resp B/P (MAP) Pulse Ox O2 Delivery O2 Flow Rate FiO2


 


20 07:00 98.7 59 18 110/45 (66) 98 Room Air  





 98.7       








Vital Signs








  Date Time  Temp Pulse Resp B/P (MAP) Pulse Ox O2 Delivery O2 Flow Rate FiO2


 


7/10/20 07:00 97.9 61 17 145/58 (87) 100 Room Air  





 97.9       











Physical Exam:


GENERAL:       No apparent distress. Alert and oriented.


HEENT:            Head normocephalic, atraumatic.


NECK:              Supple


LUNGS:            Clear to auscultation.


HEART:            RRR, S1, S2 present, pulses intact


ABDOMEN:       Soft, positive bowel sounds.


EXTREMITIES:  No cyanosis or edema.


NEUROLOGIC:  Normal speech, normal tone


PSYCHIATRIC:  Normal affect, normal mood.


SKIN:                No ulceration.





Assessment & Plan:


A/P 34y  admitted for SBO with pelvic mass


1.) Pelvic mass  concerns of malignancy.  Pap performed.  Cervix appeared nml 

with no lesions.  Mass displaced cervix to the left.  [place] mobile.  Mass may 

need to be eval with MRI or some type eval of rectum/colon.  Can be done as 

outpt if necessary


2.) Abd pain  improved, diet currently being advanced, managed by primary team,

GI, and Surg


3.) SBO  as above


4.) Contraception  BTL


5.) H/o gastric bypass


6.) H/o C/S x 5


7.) Will continue to follow











PROMISE MOON MD             Jul 10, 2020 10:01

## 2020-07-10 NOTE — NUR
dismissed to home with . reviewed follow up with Ivett Figueroa and her PCP and 
the phone numbers. rec a colonoscopy with Dr. Root see Dr. Kim for the pap and bx 
results . then see her PCP for all other. states she will take her thyroid medication when 
home . personal belongings gathered.

## 2020-07-10 NOTE — PDOC
Subjective:


Subjective:


Tolerating regular diet w/ less upper abdominal cramping - mostly occurs after 

eating.


Passing gas and stool.





Objective:


Vital Signs:





                                   Vital Signs








  Date Time  Temp Pulse Resp B/P (MAP) Pulse Ox O2 Delivery O2 Flow Rate FiO2


 


7/10/20 07:00 97.9 61 17 145/58 (87) 100 Room Air  





 97.9       








Imaging:


Reviewed GYN note:


   Speculum placed to visualized cervix.  Cervix displaced to the right.  Cervix

appears nml and smooth.  No lesions seen.  Pap perform and sent to lab.





PE:





GEN: NAD


LUNGS: CTAB


HEART: RRR


ABD: quiet BS, soft, epigastric discomfort - mild/better


NEURO/PSYCH: A & O 3





A/P:


Upper abdominal pain - enteritis vs pSBO on CT - resolved


Pelvic mass - per GYN


UTI


S/p gastric sleeve


H/o constipation - Miralax and Linzess lost efficacy


COVID-19 negative 7/7





--


Try Amitiza for constipation.


DC per primary/GYN - d/w nurse.


Follow-up w/ Gi as outpt PRN.





Justicifation of Admission Dx:


Justifications for Admission:


Justification of Admission Dx:  Yes











JOEY TOPETE         Jul 10, 2020 09:50